# Patient Record
Sex: MALE | Race: ASIAN | NOT HISPANIC OR LATINO | URBAN - METROPOLITAN AREA
[De-identification: names, ages, dates, MRNs, and addresses within clinical notes are randomized per-mention and may not be internally consistent; named-entity substitution may affect disease eponyms.]

---

## 2021-11-15 ENCOUNTER — INPATIENT (INPATIENT)
Facility: HOSPITAL | Age: 86
LOS: 2 days | Discharge: HOME | DRG: 872 | End: 2021-11-18
Attending: INTERNAL MEDICINE | Admitting: INTERNAL MEDICINE
Payer: MEDICARE

## 2021-11-15 VITALS
OXYGEN SATURATION: 98 % | HEART RATE: 57 BPM | RESPIRATION RATE: 18 BRPM | TEMPERATURE: 98 F | WEIGHT: 134.92 LBS | SYSTOLIC BLOOD PRESSURE: 147 MMHG | DIASTOLIC BLOOD PRESSURE: 86 MMHG

## 2021-11-15 DIAGNOSIS — E11.9 TYPE 2 DIABETES MELLITUS WITHOUT COMPLICATIONS: ICD-10-CM

## 2021-11-15 DIAGNOSIS — A41.9 SEPSIS, UNSPECIFIED ORGANISM: ICD-10-CM

## 2021-11-15 DIAGNOSIS — N17.9 ACUTE KIDNEY FAILURE, UNSPECIFIED: ICD-10-CM

## 2021-11-15 DIAGNOSIS — D64.9 ANEMIA, UNSPECIFIED: ICD-10-CM

## 2021-11-15 DIAGNOSIS — R63.8 OTHER SYMPTOMS AND SIGNS CONCERNING FOOD AND FLUID INTAKE: ICD-10-CM
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
## 2021-11-15 DIAGNOSIS — I10 ESSENTIAL (PRIMARY) HYPERTENSION: ICD-10-CM

## 2021-11-15 LAB
ALBUMIN SERPL ELPH-MCNC: 3.3 G/DL — LOW (ref 3.4–5)
ALP SERPL-CCNC: 73 U/L — SIGNIFICANT CHANGE UP (ref 40–120)
ALT FLD-CCNC: 14 U/L — SIGNIFICANT CHANGE UP (ref 12–42)
ANION GAP SERPL CALC-SCNC: 9 MMOL/L — SIGNIFICANT CHANGE UP (ref 9–16)
APPEARANCE UR: CLEAR — SIGNIFICANT CHANGE UP
AST SERPL-CCNC: 30 U/L — SIGNIFICANT CHANGE UP (ref 15–37)
BACTERIA # UR AUTO: PRESENT /HPF
BASOPHILS # BLD AUTO: 0.09 K/UL — SIGNIFICANT CHANGE UP (ref 0–0.2)
BASOPHILS NFR BLD AUTO: 0.6 % — SIGNIFICANT CHANGE UP (ref 0–2)
BILIRUB SERPL-MCNC: 0.6 MG/DL — SIGNIFICANT CHANGE UP (ref 0.2–1.2)
BILIRUB UR-MCNC: NEGATIVE — SIGNIFICANT CHANGE UP
BUN SERPL-MCNC: 38 MG/DL — HIGH (ref 7–23)
CALCIUM SERPL-MCNC: 9.7 MG/DL — SIGNIFICANT CHANGE UP (ref 8.5–10.5)
CHLORIDE SERPL-SCNC: 99 MMOL/L — SIGNIFICANT CHANGE UP (ref 96–108)
CK SERPL-CCNC: 157 U/L — SIGNIFICANT CHANGE UP (ref 39–308)
CO2 SERPL-SCNC: 25 MMOL/L — SIGNIFICANT CHANGE UP (ref 22–31)
COLOR SPEC: YELLOW — SIGNIFICANT CHANGE UP
CREAT SERPL-MCNC: 1.75 MG/DL — HIGH (ref 0.5–1.3)
DIFF PNL FLD: ABNORMAL
EOSINOPHIL # BLD AUTO: 0.05 K/UL — SIGNIFICANT CHANGE UP (ref 0–0.5)
EOSINOPHIL NFR BLD AUTO: 0.3 % — SIGNIFICANT CHANGE UP (ref 0–6)
EPI CELLS # UR: SIGNIFICANT CHANGE UP /HPF (ref 0–5)
GLUCOSE SERPL-MCNC: 285 MG/DL — HIGH (ref 70–99)
GLUCOSE UR QL: >=1000
HCT VFR BLD CALC: 37.4 % — LOW (ref 39–50)
HGB BLD-MCNC: 11.9 G/DL — LOW (ref 13–17)
HYALINE CASTS # UR AUTO: SIGNIFICANT CHANGE UP /LPF (ref 0–2)
IMM GRANULOCYTES NFR BLD AUTO: 1.5 % — SIGNIFICANT CHANGE UP (ref 0–1.5)
KETONES UR-MCNC: 15 MG/DL
LEUKOCYTE ESTERASE UR-ACNC: NEGATIVE — SIGNIFICANT CHANGE UP
LG PLATELETS BLD QL AUTO: SLIGHT — SIGNIFICANT CHANGE UP
LYMPHOCYTES # BLD AUTO: 1.08 K/UL — SIGNIFICANT CHANGE UP (ref 1–3.3)
LYMPHOCYTES # BLD AUTO: 7.5 % — LOW (ref 13–44)
MAGNESIUM SERPL-MCNC: 2.2 MG/DL — SIGNIFICANT CHANGE UP (ref 1.6–2.6)
MANUAL SMEAR VERIFICATION: SIGNIFICANT CHANGE UP
MCHC RBC-ENTMCNC: 30.6 PG — SIGNIFICANT CHANGE UP (ref 27–34)
MCHC RBC-ENTMCNC: 31.8 GM/DL — LOW (ref 32–36)
MCV RBC AUTO: 96.1 FL — SIGNIFICANT CHANGE UP (ref 80–100)
MONOCYTES # BLD AUTO: 0.54 K/UL — SIGNIFICANT CHANGE UP (ref 0–0.9)
MONOCYTES NFR BLD AUTO: 3.8 % — SIGNIFICANT CHANGE UP (ref 2–14)
NEUTROPHILS # BLD AUTO: 12.4 K/UL — HIGH (ref 1.8–7.4)
NEUTROPHILS NFR BLD AUTO: 86.3 % — HIGH (ref 43–77)
NITRITE UR-MCNC: POSITIVE
NRBC # BLD: 0 /100 WBCS — SIGNIFICANT CHANGE UP (ref 0–0)
PH UR: 6 — SIGNIFICANT CHANGE UP (ref 5–8)
PLAT MORPH BLD: NORMAL — SIGNIFICANT CHANGE UP
PLATELET # BLD AUTO: 171 K/UL — SIGNIFICANT CHANGE UP (ref 150–400)
PLATELET COUNT - ESTIMATE: NORMAL — SIGNIFICANT CHANGE UP
POTASSIUM SERPL-MCNC: 5.1 MMOL/L — SIGNIFICANT CHANGE UP (ref 3.5–5.3)
POTASSIUM SERPL-SCNC: 5.1 MMOL/L — SIGNIFICANT CHANGE UP (ref 3.5–5.3)
PROT SERPL-MCNC: 8.6 G/DL — HIGH (ref 6.4–8.2)
PROT UR-MCNC: >=300 MG/DL
RBC # BLD: 3.89 M/UL — LOW (ref 4.2–5.8)
RBC # FLD: 13 % — SIGNIFICANT CHANGE UP (ref 10.3–14.5)
RBC BLD AUTO: NORMAL — SIGNIFICANT CHANGE UP
RBC CASTS # UR COMP ASSIST: > 10 /HPF
SARS-COV-2 RNA SPEC QL NAA+PROBE: SIGNIFICANT CHANGE UP
SODIUM SERPL-SCNC: 133 MMOL/L — SIGNIFICANT CHANGE UP (ref 132–145)
SP GR SPEC: >=1.03 — SIGNIFICANT CHANGE UP (ref 1–1.03)
TROPONIN I, HIGH SENSITIVITY RESULT: 38.6 NG/L — SIGNIFICANT CHANGE UP
UROBILINOGEN FLD QL: 0.2 E.U./DL — SIGNIFICANT CHANGE UP
WBC # BLD: 14.38 K/UL — HIGH (ref 3.8–10.5)
WBC # FLD AUTO: 14.38 K/UL — HIGH (ref 3.8–10.5)
WBC UR QL: ABNORMAL /HPF

## 2021-11-15 PROCEDURE — 99223 1ST HOSP IP/OBS HIGH 75: CPT | Mod: GC

## 2021-11-15 PROCEDURE — 99285 EMERGENCY DEPT VISIT HI MDM: CPT | Mod: 25

## 2021-11-15 PROCEDURE — 71045 X-RAY EXAM CHEST 1 VIEW: CPT | Mod: 26

## 2021-11-15 PROCEDURE — 70450 CT HEAD/BRAIN W/O DYE: CPT | Mod: 26,MH

## 2021-11-15 PROCEDURE — 93010 ELECTROCARDIOGRAM REPORT: CPT

## 2021-11-15 RX ORDER — ACETAMINOPHEN 500 MG
650 TABLET ORAL EVERY 6 HOURS
Refills: 0 | Status: DISCONTINUED | OUTPATIENT
Start: 2021-11-15 | End: 2021-11-18

## 2021-11-15 RX ORDER — POLYETHYLENE GLYCOL 3350 17 G/17G
17 POWDER, FOR SOLUTION ORAL DAILY
Refills: 0 | Status: DISCONTINUED | OUTPATIENT
Start: 2021-11-15 | End: 2021-11-18

## 2021-11-15 RX ORDER — DEXTROSE 50 % IN WATER 50 %
25 SYRINGE (ML) INTRAVENOUS ONCE
Refills: 0 | Status: DISCONTINUED | OUTPATIENT
Start: 2021-11-15 | End: 2021-11-18

## 2021-11-15 RX ORDER — SODIUM CHLORIDE 9 MG/ML
1000 INJECTION INTRAMUSCULAR; INTRAVENOUS; SUBCUTANEOUS ONCE
Refills: 0 | Status: DISCONTINUED | OUTPATIENT
Start: 2021-11-15 | End: 2021-11-16

## 2021-11-15 RX ORDER — CEFTRIAXONE 500 MG/1
1000 INJECTION, POWDER, FOR SOLUTION INTRAMUSCULAR; INTRAVENOUS ONCE
Refills: 0 | Status: COMPLETED | OUTPATIENT
Start: 2021-11-15 | End: 2021-11-15

## 2021-11-15 RX ORDER — SODIUM CHLORIDE 9 MG/ML
1000 INJECTION INTRAMUSCULAR; INTRAVENOUS; SUBCUTANEOUS ONCE
Refills: 0 | Status: COMPLETED | OUTPATIENT
Start: 2021-11-15 | End: 2021-11-15

## 2021-11-15 RX ORDER — SENNA PLUS 8.6 MG/1
2 TABLET ORAL AT BEDTIME
Refills: 0 | Status: DISCONTINUED | OUTPATIENT
Start: 2021-11-15 | End: 2021-11-18

## 2021-11-15 RX ORDER — GLUCAGON INJECTION, SOLUTION 0.5 MG/.1ML
1 INJECTION, SOLUTION SUBCUTANEOUS ONCE
Refills: 0 | Status: DISCONTINUED | OUTPATIENT
Start: 2021-11-15 | End: 2021-11-18

## 2021-11-15 RX ORDER — SODIUM CHLORIDE 9 MG/ML
1000 INJECTION, SOLUTION INTRAVENOUS
Refills: 0 | Status: DISCONTINUED | OUTPATIENT
Start: 2021-11-15 | End: 2021-11-18

## 2021-11-15 RX ORDER — DEXTROSE 50 % IN WATER 50 %
15 SYRINGE (ML) INTRAVENOUS ONCE
Refills: 0 | Status: DISCONTINUED | OUTPATIENT
Start: 2021-11-15 | End: 2021-11-18

## 2021-11-15 RX ORDER — INSULIN LISPRO 100/ML
VIAL (ML) SUBCUTANEOUS
Refills: 0 | Status: DISCONTINUED | OUTPATIENT
Start: 2021-11-15 | End: 2021-11-18

## 2021-11-15 RX ADMIN — CEFTRIAXONE 100 MILLIGRAM(S): 500 INJECTION, POWDER, FOR SOLUTION INTRAMUSCULAR; INTRAVENOUS at 18:20

## 2021-11-15 RX ADMIN — SODIUM CHLORIDE 2000 MILLILITER(S): 9 INJECTION INTRAMUSCULAR; INTRAVENOUS; SUBCUTANEOUS at 16:40

## 2021-11-15 NOTE — ED ADULT NURSE NOTE - CHPI ED NUR SYMPTOMS NEG
no blurred vision/no change in level of consciousness/no confusion/no nausea/no numbness/no vomiting

## 2021-11-15 NOTE — ED PROVIDER NOTE - ENMT, MLM
Airway patent, Nasal mucosa clear. Mouth with normal mucosa. Airway patent, Nasal mucosa clear. Mouth with normal mucosa.  Hard of hearing.

## 2021-11-15 NOTE — ED ADULT TRIAGE NOTE - CHIEF COMPLAINT QUOTE
elderly Mandarin speaking pt aisha from [facility] called 911 after found sitting on the ground. states he did not fall or pass out, but decided to sit down bc his legs felt "weak and heavy" pt has left arm drift, unable to lift and hold right leg and left leg hits the bed at 2 sec, pt endorses these symptoms are over 1 year old denies hx stroke; pmh HTN and DM states he is compliant with meds arrives aaox4 unable to ambulate elderly Mandarin speaking pt aisha from [facility] called 911 after found sitting on the ground. states he did not fall or pass out, but decided to sit down bc his legs felt "weak and heavy" pt has left arm drift, unable to lift and hold right leg and left leg hits the bed at 2 sec, pt endorses these symptoms are over 1 year old denies hx stroke; pmh HTN and DM states he is compliant with meds arrives aaox4 unable to ambulate [facility] SS 6

## 2021-11-15 NOTE — H&P ADULT - NSHPPHYSICALEXAM_GEN_ALL_CORE
VITAL SIGNS:  T(C): 36.9 (11-15-21 @ 20:53), Max: 36.9 (11-15-21 @ 20:53)  T(F): 98.5 (11-15-21 @ 20:53), Max: 98.5 (11-15-21 @ 20:53)  HR: 65 (11-15-21 @ 20:53) (57 - 65)  BP: 147/70 (11-15-21 @ 20:53) (147/70 - 148/70)  BP(mean): --  RR: 16 (11-15-21 @ 20:53) (16 - 18)  SpO2: 98% (11-15-21 @ 20:53) (98% - 98%)  Wt(kg): --    PHYSICAL EXAM:    Constitutional: WDWN resting comfortably in bed; NAD  Head: NC/AT  Eyes: PERRL, EOMI, anicteric sclera  ENT: no nasal discharge; uvula midline, no oropharyngeal erythema or exudates; MMM  Neck: supple; no JVD or thyromegaly  Respiratory: CTA B/L; no W/R/R, no retractions  Cardiac: +S1/S2; RRR; no M/R/G  Gastrointestinal: abdomen soft, NT/ND; no rebound or guarding; +BSx4  Back: spine midline, no bony tenderness or step-offs  Extremities: WWP, no clubbing or cyanosis; no peripheral edema  Musculoskeletal: NROM x4; no joint swelling, tenderness or erythema  Vascular: 2+ radial, DP/PT pulses B/L  Dermatologic: skin warm, dry and intact; no rashes, wounds, or scars  Lymphatic: no submandibular or cervical LAD  Neurologic: AAOx3; CNII-XII grossly intact; no focal deficits  Psychiatric: affect and characteristics of appearance, verbalizations, behaviors are appropriate VITAL SIGNS:  T(C): 36.9 (11-15-21 @ 20:53), Max: 36.9 (11-15-21 @ 20:53)  T(F): 98.5 (11-15-21 @ 20:53), Max: 98.5 (11-15-21 @ 20:53)  HR: 65 (11-15-21 @ 20:53) (57 - 65)  BP: 147/70 (11-15-21 @ 20:53) (147/70 - 148/70)  BP(mean): --  RR: 16 (11-15-21 @ 20:53) (16 - 18)  SpO2: 98% (11-15-21 @ 20:53) (98% - 98%)  Wt(kg): --    PHYSICAL EXAM:    Constitutional: thin, elderly male, resting comfortably in bed; NAD  Head: NC/AT  Eyes: PERRL, EOMI, anicteric sclera  ENT: no nasal discharge; uvula midline, no oropharyngeal erythema or exudates; MMM  Neck: supple  Respiratory: CTA B/L; no W/R/R, no retractions  Cardiac: +S1/S2; RRR; no M/R/G  Gastrointestinal: abdomen soft, NT/ND; no rebound or guarding; +BSx4  Back: spine midline, no bony tenderness or step-offs  Extremities: WWP, no clubbing or cyanosis; no peripheral edema  Musculoskeletal: NROM x4; no joint swelling, tenderness or erythema  Vascular: 2+ radial, DP/PT pulses B/L  Dermatologic: skin warm, dry and intact; no rashes, wounds, or scars  Lymphatic: no submandibular or cervical LAD  Neurologic: AAOx3; CNII-XII grossly intact; no focal deficits  Psychiatric: affect and characteristics of appearance, verbalizations, behaviors are appropriate

## 2021-11-15 NOTE — H&P ADULT - PROBLEM SELECTOR PLAN 5
Pt w/ hx of BPH - reports intermittent hematuria and he follows with a urologist  - reports issues with retention  - c/w finasteride 5 mg QD

## 2021-11-15 NOTE — ED PROVIDER NOTE - PROGRESS NOTE DETAILS
Labs and imaging reviewed.  Patient found to be ataxic during brief walking trial.  + leukocytosis with left shift, hyperglycemia, renal dysfunction, and UTI.  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 Given IVF for hyperglycemia and IV rocephin for UTI with urine culture sent.  ED  to perform as assessment prior to admission.  Accepted by Dr. Batista to [facility]. Granddaughter  Nemo - [phone]  Betsy - [phone]    Daughter  Elisa - [phone] Labs and imaging reviewed.  Patient found to be ataxic during brief walking trial.  + leukocytosis with left shift, hyperglycemia, renal dysfunction, and UTI.  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 Given IVF for hyperglycemia and IV Rocephin for UTI with urine culture sent.  ED  to perform as assessment prior to admission.  Accepted by Dr. Batista to [facility].

## 2021-11-15 NOTE — ED ADULT NURSE REASSESSMENT NOTE - NS ED NURSE REASSESS COMMENT FT1
Break coverage for maylin petty, maylin hood placed iv and started iv lfuids, pt awake, awaiting further review

## 2021-11-15 NOTE — H&P ADULT - PROBLEM SELECTOR PLAN 3
BUN/Cr 38/1.75, unknown baseline. Possibly pre-renal vs post-obstructive given evidence of dehydration on urinalysis and hx of BPH/retention.   - s/p 1 L NS, consider maintenance fluid  - bladder scan  - check urine lytes

## 2021-11-15 NOTE — ED ADULT NURSE NOTE - CHIEF COMPLAINT QUOTE
elderly Mandarin speaking pt aisha from [facility] called 911 after found sitting on the ground. states he did not fall or pass out, but decided to sit down bc his legs felt "weak and heavy" pt has left arm drift, unable to lift and hold right leg and left leg hits the bed at 2 sec, pt endorses these symptoms are over 1 year old denies hx stroke; pmh HTN and DM states he is compliant with meds arrives aaox4 unable to ambulate

## 2021-11-15 NOTE — ED PROVIDER NOTE - OBJECTIVE STATEMENT
89 y/o M with PMH of non-IDDM and hypertension [Hx obtained by triage nurse and Mandarin  Jakob [id number]] presents to the ED for chronic lower extremity pain and paresthesia that has been ongoing for many years which caused him to fall earlier today. Pt reports this has happened to him before in the past. He denies weakness, dizziness, lightheadedness prior to fall. He arrives to the ED with wife after bystander flagged down EMS. Pt is unable to articulate pharmacy or daily medication use. He has no previous visits to this ED per triage. He arrives without his walker and with some personal belongings. 89 y/o M with PMH of non-IDDM and hypertension [Hx obtained by triage nurse and Mandarin  Jakob [id number]] presents to the ED for chronic lower extremity pain and paresthesia that has been ongoing for many years which caused him to fall earlier today. Pt reports this has happened to him before in the past. He denies weakness, dizziness, lightheadedness prior to fall. He arrives to the ED with wife after bystander flagged down EMS. Pt is unable to articulate pharmacy or daily medication use. He has no previous visits to this ED per triage. He arrives without a walker and with some personal belongings.

## 2021-11-15 NOTE — H&P ADULT - PROBLEM SELECTOR PLAN 1
Pt presenting after fall today while out shopping, legs became weak and he fell on his buttocks. No prodromal syndromes. Denies any pain. CTH w/ chronic subdural collections/hygromas, no fx or bleed. Pt reports 2-3 years of leg pain and numbness likely 2/2 to possible peripheral neuropathy from DM.  - check a1c  - PT evaluation Pt presenting after fall today while out shopping, legs became weak and he fell on his buttocks. No prodromal syndromes. Denies any pain. CTH w/ chronic subdural collections/hygromas, no fx or bleed. Pt reports 2-3 years of leg pain and numbness likely 2/2 to possible peripheral neuropathy from DM, UTI  - check a1c, b12, folate,  - PT evaluation Pt presenting after fall today while out shopping, legs became weak and he fell on his buttocks. No prodromal syndromes. Denies any pain. CTH w/ chronic subdural collections/hygromas, no fx or bleed. Pt reports 2-3 years of leg pain and numbness likely 2/2 to possible peripheral neuropathy from DM. Fall likely in setting of UTI. Low suspicion for neurogenic or cardiac cause.  - check a1c, b12, folate,  - PT evaluation

## 2021-11-15 NOTE — H&P ADULT - PROBLEM SELECTOR PLAN 4
Hb 11.8, unknown baseline.  - pt reports 1-2 year hx of black stools  - continue to monitor Hb and stools Hb 11.8, unknown baseline.  - pt reports 1-2 year hx of black stools  - continue to monitor Hb and stools  - start ppi Hb 11.8, unknown baseline.  - pt reports 1-2 year hx of black stools  - continue to monitor Hb and stools  - start ppi  - fobt Hb 11.8, unknown baseline.  - pt reports 1-2 year hx of black stools, unclear as pt is poor historian   - continue to monitor Hb and stools  - start ppi  - check fobt

## 2021-11-15 NOTE — ED PROVIDER NOTE - CLINICAL SUMMARY MEDICAL DECISION MAKING FREE TEXT BOX
89 y/o M with PMH of hypertension and non-IDDM presents to the ED with fall vs syncope. History obtained via  although Pt is a poor historian. He endorses chronic presumed neuropathic pain, leg weakness, and frequent falls. Pt lives at home with his wife. Unable to obtain home medications list, PCP [Dr. Ami Car], or pharmacy information. Upon rapid assessment, Pt appears in his state of age. No acute distress, scalp hematoma, or trauma. Lungs are clear. No loud murmurs. No truncal ataxia. Pt placed on fall precautions. Plan for labs, urine testing, and imaging.

## 2021-11-15 NOTE — ED PROVIDER NOTE - CONSTITUTIONAL, MLM
normal... Well appearing, awake, alert, oriented to person, place, time/situation and in no apparent distress. Appears his stated age, awake, alert, oriented to person, place, time/situation and in no apparent distress.

## 2021-11-15 NOTE — ED ADULT NURSE REASSESSMENT NOTE - NS ED NURSE REASSESS COMMENT FT1
[facility] ED HADLEY Ruelas called to say this patient is a missing persons ([employer] present in [facility] ED looking for him). Was informed pt is admitted and will be going up to [address] [facility] but [facility] has asked that we hold patient until Officer Alexandra responds to gather information. Dr. Barajas made aware.

## 2021-11-15 NOTE — H&P ADULT - HISTORY OF PRESENT ILLNESS
Pt is a 89 y/o M w/ pmhx of HTN, DM presenting after fall today.    In ED, vitals: 96.2 rectal, /70, HR 59, /70, RR 18, 98% on RA  Labs: wbc 14.38, Hb 11.9, BUN/Cr 38/1.75 (unknown baseline), glu 285, U/A positive for protein, wbc 5-10  CXR: no acute infiltrates  EKG:   CTH: b/l chronic b/l subdural collections/hygromas  ED management: ceftriaxone x1 and 1 L NS   Pt is a 89 y/o M w/ pmhx of HTN, DM, BPH presenting after fall today. Pt states he was out shopping when his legs became weak and he fell on his bottom. He reports he has been having problems with numbness and weak for the past 2-3 years. He denies any prodromal symptoms of chest pain, SOB, palpitations, light headedness/dizziness prior to event. He did not hit his head or lose consciousness. He states he otherwise has been in his usual state of health. Denies any fever, chills, abd pain, n/v/d, or dysuria. Reports problems with urination/retention 2/2 to his enlarged prostate. Also reports constipation however states he has a bowel movement every day. Reports stools are hard.      In ED, vitals: 96.2 rectal, /70, HR 59, /70, RR 18, 98% on RA  Labs: wbc 14.38, Hb 11.9, BUN/Cr 38/1.75 (unknown baseline), glu 285, U/A positive for protein, wbc 5-10  CXR: no acute infiltrates  EKG:   CTH: b/l chronic b/l subdural collections/hygromas  ED management: ceftriaxone x1 and 1 L NS   Pt is a 89 y/o M w/ pmhx of HTN, DM, BPH presenting after fall today. Pt states he was out shopping when his legs became weak and he fell on his bottom. He reports he has been having problems with numbness and weak for the past 2-3 years. He denies any prodromal symptoms of chest pain, SOB, palpitations, light headedness/dizziness prior to event. He did not hit his head or lose consciousness. He states he otherwise has been in his usual state of health. Denies any fever, chills, abd pain, n/v/d, or dysuria. Reports problems with urination/retention 2/2 to his enlarged prostate. Also reports constipation however states he has a bowel movement every day. Reports stools are hard and black - states his stools have been black for 1-2 years. Had a colonoscopy in the past but no endoscopy.     In ED, vitals: 96.2 rectal, /70, HR 59, /70, RR 18, 98% on RA  Labs: wbc 14.38, Hb 11.9, BUN/Cr 38/1.75 (unknown baseline), glu 285, U/A positive for protein, wbc 5-10  CXR: no acute infiltrates  EKG:   CTH: b/l chronic b/l subdural collections/hygromas  ED management: ceftriaxone x1 and 1 L NS   Pt is a 89 y/o M w/ pmhx of HTN, DM, BPH presenting after fall today. Pt states he was out shopping when his legs became weak and he fell on his bottom. He reports he has been having problems with numbness and weak for the past 2-3 years. Denies any back pain, b/b incontinence. He denies any prodromal symptoms of chest pain, SOB, palpitations, light headedness/dizziness prior to event. He did not hit his head or lose consciousness. He states he otherwise has been in his usual state of health. Denies any fever, chills, abd pain, n/v/d, or dysuria. Reports problems with urination/retention 2/2 to his enlarged prostate. Also reports constipation however states he has a bowel movement every day. Reports stools are hard and black - states his stools have been black for 1-2 years. Had a colonoscopy in the past but no endoscopy.     In ED, vitals: 96.2 rectal, /70, HR 59, /70, RR 18, 98% on RA  Labs: wbc 14.38, Hb 11.9, BUN/Cr 38/1.75 (unknown baseline), glu 285, U/A positive for protein, wbc 5-10  CXR: no acute infiltrates  EKG: sinus bradycardia, no acute ischemic changes  CTH: b/l chronic b/l subdural collections/hygromas  ED management: ceftriaxone x1 and 1 L NS

## 2021-11-15 NOTE — ED PROVIDER NOTE - NEUROLOGICAL, MLM
Alert and oriented, no focal deficits, no motor or sensory deficits. Alert and oriented.  Slow but clear speech. Follows basic commands.  No limb rigidity.  No truncal ataxia but when standing is unable to take more than 1-2 steps with assistance.

## 2021-11-15 NOTE — ED BEHAVIORAL HEALTH NOTE - BEHAVIORAL HEALTH NOTE
SW was consulted to the ED by Provider to complete psychosocial assessment with PT.  A  was used to complete assessment #[id number] Daniel.   Team made aware and SW made available for further assistance.

## 2021-11-15 NOTE — H&P ADULT - NSHPLABSRESULTS_GEN_ALL_CORE
.  LABS:                         11.9   14.38 )-----------( 171      ( 15 Nov 2021 16:01 )             37.4     11-15    133  |  99  |  38<H>  ----------------------------<  285<H>  5.1   |  25  |  1.75<H>    Ca    9.7      15 Nov 2021 16:01  Mg     2.2     11-15    TPro  8.6<H>  /  Alb  3.3<L>  /  TBili  0.6  /  DBili  x   /  AST  30  /  ALT  14  /  AlkPhos  73  11-15      Urinalysis Basic - ( 15 Nov 2021 17:11 )    Color: Yellow / Appearance: Clear / SG: >=1.030 / pH: x  Gluc: x / Ketone: 15 mg/dL  / Bili: NEGATIVE / Urobili: 0.2 E.U./dL   Blood: x / Protein: >=300 mg/dL / Nitrite: POSITIVE   Leuk Esterase: NEGATIVE / RBC: > 10 /HPF / WBC 5-10 /HPF   Sq Epi: x / Non Sq Epi: 0-5 /HPF / Bacteria: Present /HPF      CARDIAC MARKERS ( 15 Nov 2021 17:35 )  x     / x     / 157 U/L / x     / x                RADIOLOGY, EKG & ADDITIONAL TESTS: Reviewed.

## 2021-11-15 NOTE — H&P ADULT - PROBLEM SELECTOR PLAN 8
F: s/p 1 L NS  E: replete prn  N: dash/tlc, consistent carbs  DVT: SCDs, possible melena?  Dispo: RMF F: s/p 1 L NS  E: replete prn  N: dash/tlc, consistent carbs  DVT: SCDs  Dispo: RMF

## 2021-11-15 NOTE — ED ADULT NURSE REASSESSMENT NOTE - NS ED NURSE REASSESS COMMENT FT1
used  Jakob [id number] to ask pt to pass urine otherwise we can do straight cath, pt wants his mandarin speaking wife to be informed pt is here as she will be worried her name is Terri Ron  , details given to dr hawk, also informed pt we will require rectal temp as per dr hawk

## 2021-11-15 NOTE — H&P ADULT - ATTENDING COMMENTS
#Fall: Presents after fall today while out shopping, legs became weak and he fell on his buttocks. Denies head trauma, Syncope, LOC, prodromal symptoms. Found to be septic likely 2/2 UTI. Denies bladder/bowel incontinence or back pain, no sciatica. No FND; CTH w/ chronic hygromas. EKG nsr, non ischmic; trops negative. Fall/weakness likely 2/2 sepsis and UTI; unlikely associated w/ chonic hygromas, reasses gait in AM. Consider NSG; f/up b12, folate, tsh, rpr, PT/OT consult    #Sepsis: Likely 2/2 UTI; UA contaminated however +symptoms; c/w ceft , f/up urine and blood cx. Bladder scan #Fall: Presents after fall today while out shopping, legs became weak and he fell on his buttocks. Denies head trauma, Syncope, LOC, prodromal symptoms. Found to be septic likely 2/2 UTI. Denies bladder/bowel incontinence or back pain, no sciatica. No FND; CTH w/ chronic hygromas. EKG sinus inez, non ischmic; trops negative. Fall/weakness likely 2/2 sepsis and UTI; unlikely associated w/ chonic hygromas, reasses gait in AM. Consider NSG; f/up b12, folate, tsh, rpr, PT/OT consult    #Sepsis: Likely 2/2 UTI; UA contaminated however +symptoms; c/w ceft , f/up urine and blood cx. Bladder scan

## 2021-11-15 NOTE — H&P ADULT - PROBLEM SELECTOR PLAN 2
Pt met 2/4 SIRS criteria with T 96.2 rectal, wbc 14.38 with possible UTI - U/A w/ wbc 5-10 wbc.  - pt s/p 1 L NS in ED and ceftriaxone 1 g  - Pt denies any dysuria however given hx of BPH/urinary retention and pt meeting SIRS criteria, will continue with ceftriaxone.  - f/u urine cx  - obtain bcx

## 2021-11-16 ENCOUNTER — TRANSCRIPTION ENCOUNTER (OUTPATIENT)
Age: 86
End: 2021-11-16

## 2021-11-16 DIAGNOSIS — N40.0 BENIGN PROSTATIC HYPERPLASIA WITHOUT LOWER URINARY TRACT SYMPTOMS: ICD-10-CM

## 2021-11-16 DIAGNOSIS — N39.0 URINARY TRACT INFECTION, SITE NOT SPECIFIED: ICD-10-CM

## 2021-11-16 LAB
A1C WITH ESTIMATED AVERAGE GLUCOSE RESULT: >20.1 % — HIGH (ref 4–5.6)
ANION GAP SERPL CALC-SCNC: 14 MMOL/L — SIGNIFICANT CHANGE UP (ref 5–17)
BASOPHILS # BLD AUTO: 0.05 K/UL — SIGNIFICANT CHANGE UP (ref 0–0.2)
BASOPHILS NFR BLD AUTO: 0.4 % — SIGNIFICANT CHANGE UP (ref 0–2)
BLD GP AB SCN SERPL QL: NEGATIVE — SIGNIFICANT CHANGE UP
BUN SERPL-MCNC: 32 MG/DL — HIGH (ref 7–23)
CALCIUM SERPL-MCNC: 9.6 MG/DL — SIGNIFICANT CHANGE UP (ref 8.4–10.5)
CHLORIDE SERPL-SCNC: 102 MMOL/L — SIGNIFICANT CHANGE UP (ref 96–108)
CO2 SERPL-SCNC: 20 MMOL/L — LOW (ref 22–31)
CREAT ?TM UR-MCNC: 73 MG/DL — SIGNIFICANT CHANGE UP
CREAT ?TM UR-MCNC: 75 MG/DL — SIGNIFICANT CHANGE UP
CREAT SERPL-MCNC: 1.27 MG/DL — SIGNIFICANT CHANGE UP (ref 0.5–1.3)
EOSINOPHIL # BLD AUTO: 0.02 K/UL — SIGNIFICANT CHANGE UP (ref 0–0.5)
EOSINOPHIL NFR BLD AUTO: 0.2 % — SIGNIFICANT CHANGE UP (ref 0–6)
ESTIMATED AVERAGE GLUCOSE: >441 MG/DL — HIGH (ref 68–114)
FERRITIN SERPL-MCNC: 150 NG/ML — SIGNIFICANT CHANGE UP (ref 30–400)
GLUCOSE BLDC GLUCOMTR-MCNC: 150 MG/DL — HIGH (ref 70–99)
GLUCOSE BLDC GLUCOMTR-MCNC: 219 MG/DL — HIGH (ref 70–99)
GLUCOSE BLDC GLUCOMTR-MCNC: 281 MG/DL — HIGH (ref 70–99)
GLUCOSE BLDC GLUCOMTR-MCNC: 340 MG/DL — HIGH (ref 70–99)
GLUCOSE SERPL-MCNC: 324 MG/DL — HIGH (ref 70–99)
HCT VFR BLD CALC: 34.5 % — LOW (ref 39–50)
HGB BLD-MCNC: 11.1 G/DL — LOW (ref 13–17)
IMM GRANULOCYTES NFR BLD AUTO: 0.6 % — SIGNIFICANT CHANGE UP (ref 0–1.5)
IRON SATN MFR SERPL: 24 % — SIGNIFICANT CHANGE UP (ref 16–55)
IRON SATN MFR SERPL: 45 UG/DL — SIGNIFICANT CHANGE UP (ref 45–165)
LYMPHOCYTES # BLD AUTO: 17.4 % — SIGNIFICANT CHANGE UP (ref 13–44)
LYMPHOCYTES # BLD AUTO: 2.01 K/UL — SIGNIFICANT CHANGE UP (ref 1–3.3)
MAGNESIUM SERPL-MCNC: 2.3 MG/DL — SIGNIFICANT CHANGE UP (ref 1.6–2.6)
MCHC RBC-ENTMCNC: 30.2 PG — SIGNIFICANT CHANGE UP (ref 27–34)
MCHC RBC-ENTMCNC: 32.2 GM/DL — SIGNIFICANT CHANGE UP (ref 32–36)
MCV RBC AUTO: 94 FL — SIGNIFICANT CHANGE UP (ref 80–100)
MONOCYTES # BLD AUTO: 0.99 K/UL — HIGH (ref 0–0.9)
MONOCYTES NFR BLD AUTO: 8.6 % — SIGNIFICANT CHANGE UP (ref 2–14)
NEUTROPHILS # BLD AUTO: 8.41 K/UL — HIGH (ref 1.8–7.4)
NEUTROPHILS NFR BLD AUTO: 72.8 % — SIGNIFICANT CHANGE UP (ref 43–77)
NRBC # BLD: 0 /100 WBCS — SIGNIFICANT CHANGE UP (ref 0–0)
PHOSPHATE SERPL-MCNC: 4.4 MG/DL — SIGNIFICANT CHANGE UP (ref 2.5–4.5)
PLATELET # BLD AUTO: 178 K/UL — SIGNIFICANT CHANGE UP (ref 150–400)
POTASSIUM SERPL-MCNC: 4.4 MMOL/L — SIGNIFICANT CHANGE UP (ref 3.5–5.3)
POTASSIUM SERPL-SCNC: 4.4 MMOL/L — SIGNIFICANT CHANGE UP (ref 3.5–5.3)
PROT ?TM UR-MCNC: 161 MG/DL — HIGH (ref 0–12)
PROT/CREAT UR-RTO: 2.2 RATIO — HIGH (ref 0–0.2)
RBC # BLD: 3.67 M/UL — LOW (ref 4.2–5.8)
RBC # FLD: 13.2 % — SIGNIFICANT CHANGE UP (ref 10.3–14.5)
RH IG SCN BLD-IMP: POSITIVE — SIGNIFICANT CHANGE UP
SODIUM SERPL-SCNC: 136 MMOL/L — SIGNIFICANT CHANGE UP (ref 135–145)
SODIUM UR-SCNC: 97 MMOL/L — SIGNIFICANT CHANGE UP
TIBC SERPL-MCNC: 187 UG/DL — LOW (ref 220–430)
TSH SERPL-MCNC: 0.84 UIU/ML — SIGNIFICANT CHANGE UP (ref 0.27–4.2)
UIBC SERPL-MCNC: 142 UG/DL — SIGNIFICANT CHANGE UP (ref 110–370)
WBC # BLD: 11.55 K/UL — HIGH (ref 3.8–10.5)
WBC # FLD AUTO: 11.55 K/UL — HIGH (ref 3.8–10.5)

## 2021-11-16 PROCEDURE — 99222 1ST HOSP IP/OBS MODERATE 55: CPT | Mod: GC

## 2021-11-16 PROCEDURE — 99232 SBSQ HOSP IP/OBS MODERATE 35: CPT | Mod: GC

## 2021-11-16 RX ORDER — PANTOPRAZOLE SODIUM 20 MG/1
40 TABLET, DELAYED RELEASE ORAL EVERY 12 HOURS
Refills: 0 | Status: DISCONTINUED | OUTPATIENT
Start: 2021-11-16 | End: 2021-11-16

## 2021-11-16 RX ORDER — CEFTRIAXONE 500 MG/1
1000 INJECTION, POWDER, FOR SOLUTION INTRAMUSCULAR; INTRAVENOUS EVERY 24 HOURS
Refills: 0 | Status: COMPLETED | OUTPATIENT
Start: 2021-11-16 | End: 2021-11-17

## 2021-11-16 RX ORDER — INFLUENZA VIRUS VACCINE 15; 15; 15; 15 UG/.5ML; UG/.5ML; UG/.5ML; UG/.5ML
0.7 SUSPENSION INTRAMUSCULAR ONCE
Refills: 0 | Status: DISCONTINUED | OUTPATIENT
Start: 2021-11-16 | End: 2021-11-18

## 2021-11-16 RX ORDER — INSULIN LISPRO 100/ML
3 VIAL (ML) SUBCUTANEOUS
Refills: 0 | Status: DISCONTINUED | OUTPATIENT
Start: 2021-11-16 | End: 2021-11-16

## 2021-11-16 RX ORDER — ATORVASTATIN CALCIUM 80 MG/1
40 TABLET, FILM COATED ORAL AT BEDTIME
Refills: 0 | Status: DISCONTINUED | OUTPATIENT
Start: 2021-11-16 | End: 2021-11-18

## 2021-11-16 RX ORDER — INSULIN GLARGINE 100 [IU]/ML
10 INJECTION, SOLUTION SUBCUTANEOUS AT BEDTIME
Refills: 0 | Status: DISCONTINUED | OUTPATIENT
Start: 2021-11-16 | End: 2021-11-16

## 2021-11-16 RX ORDER — FINASTERIDE 5 MG/1
5 TABLET, FILM COATED ORAL DAILY
Refills: 0 | Status: DISCONTINUED | OUTPATIENT
Start: 2021-11-16 | End: 2021-11-18

## 2021-11-16 RX ORDER — INSULIN GLARGINE 100 [IU]/ML
12 INJECTION, SOLUTION SUBCUTANEOUS AT BEDTIME
Refills: 0 | Status: DISCONTINUED | OUTPATIENT
Start: 2021-11-16 | End: 2021-11-17

## 2021-11-16 RX ORDER — SODIUM CHLORIDE 9 MG/ML
1000 INJECTION, SOLUTION INTRAVENOUS ONCE
Refills: 0 | Status: COMPLETED | OUTPATIENT
Start: 2021-11-16 | End: 2021-11-16

## 2021-11-16 RX ORDER — ENOXAPARIN SODIUM 100 MG/ML
40 INJECTION SUBCUTANEOUS EVERY 24 HOURS
Refills: 0 | Status: DISCONTINUED | OUTPATIENT
Start: 2021-11-16 | End: 2021-11-18

## 2021-11-16 RX ORDER — INSULIN LISPRO 100/ML
8 VIAL (ML) SUBCUTANEOUS
Refills: 0 | Status: DISCONTINUED | OUTPATIENT
Start: 2021-11-16 | End: 2021-11-17

## 2021-11-16 RX ADMIN — Medication 4: at 17:46

## 2021-11-16 RX ADMIN — PANTOPRAZOLE SODIUM 40 MILLIGRAM(S): 20 TABLET, DELAYED RELEASE ORAL at 06:50

## 2021-11-16 RX ADMIN — ATORVASTATIN CALCIUM 40 MILLIGRAM(S): 80 TABLET, FILM COATED ORAL at 21:48

## 2021-11-16 RX ADMIN — INSULIN GLARGINE 12 UNIT(S): 100 INJECTION, SOLUTION SUBCUTANEOUS at 22:21

## 2021-11-16 RX ADMIN — POLYETHYLENE GLYCOL 3350 17 GRAM(S): 17 POWDER, FOR SOLUTION ORAL at 12:56

## 2021-11-16 RX ADMIN — Medication 3 UNIT(S): at 12:56

## 2021-11-16 RX ADMIN — Medication 6: at 09:53

## 2021-11-16 RX ADMIN — SENNA PLUS 2 TABLET(S): 8.6 TABLET ORAL at 21:48

## 2021-11-16 RX ADMIN — FINASTERIDE 5 MILLIGRAM(S): 5 TABLET, FILM COATED ORAL at 12:57

## 2021-11-16 RX ADMIN — Medication 8 UNIT(S): at 17:46

## 2021-11-16 RX ADMIN — Medication 8: at 12:57

## 2021-11-16 RX ADMIN — ENOXAPARIN SODIUM 40 MILLIGRAM(S): 100 INJECTION SUBCUTANEOUS at 22:17

## 2021-11-16 RX ADMIN — CEFTRIAXONE 100 MILLIGRAM(S): 500 INJECTION, POWDER, FOR SOLUTION INTRAMUSCULAR; INTRAVENOUS at 17:46

## 2021-11-16 RX ADMIN — SODIUM CHLORIDE 1000 MILLILITER(S): 9 INJECTION, SOLUTION INTRAVENOUS at 12:57

## 2021-11-16 NOTE — DISCHARGE NOTE PROVIDER - NSDCMRMEDTOKEN_GEN_ALL_CORE_FT
atorvastatin 40 mg oral tablet: 1 tab(s) orally once a day (at bedtime)  cefpodoxime 100 mg oral tablet: 1 tab(s) orally every 12 hours  finasteride 5 mg oral tablet: 1 tab(s) orally once a day  insulin glargine: 12 unit(s) subcutaneous once a day (at bedtime)  insulin lispro 100 units/mL injectable solution: 12 unit(s) subcutaneous 3 times a day before meals  tamsulosin 0.4 mg oral capsule: 1 cap(s) orally once a day (at bedtime)   atorvastatin 40 mg oral tablet: 1 tab(s) orally once a day (at bedtime)  Basaglar KwikPen 100 units/mL subcutaneous solution: 12 unit(s) subcutaneous once a day (at bedtime)   cefpodoxime 100 mg oral tablet: 1 tab(s) orally every 12 hours  finasteride 5 mg oral tablet: 1 tab(s) orally once a day  Insulin Lispro KwikPen 100 units/mL injectable solution: 12 unit(s) injectable 3 times a day before meals   tamsulosin 0.4 mg oral capsule: 1 cap(s) orally once a day (at bedtime)   atorvastatin 40 mg oral tablet: 1 tab(s) orally once a day (at bedtime)  Basaglar KwikPen 100 units/mL subcutaneous solution: 12 unit(s) subcutaneous once a day (at bedtime)   cefpodoxime 100 mg oral tablet: 1 tab(s) orally every 12 hours  finasteride 5 mg oral tablet: 1 tab(s) orally once a day  Insulin Lispro KwikPen 100 units/mL injectable solution: 12 unit(s) injectable 3 times a day before meals   lancets: 1 application subcutaneously 4 times a day   tamsulosin 0.4 mg oral capsule: 1 cap(s) orally once a day (at bedtime)

## 2021-11-16 NOTE — PROGRESS NOTE ADULT - PROBLEM SELECTOR PLAN 2
SIRS 2/4 hypothermic, leukocytosis w source UTI. No pulmonary complaints or other source of infxn. Pt fluid resuscitated and started on CTX. WBC downtrending, temp normal. Weakness resolved.  - c/w CTX 1g daily  - f/u blood cx  - f/u urine cx

## 2021-11-16 NOTE — DISCHARGE NOTE PROVIDER - CARE PROVIDERS DIRECT ADDRESSES
[email or web address]_Unknown ,DirectAddress_[email or web address]

## 2021-11-16 NOTE — PROGRESS NOTE ADULT - PROBLEM SELECTOR PLAN 5
Pt has complaints of dysuria w hx of BPH and uses jardiance. UA positive for WBC and presented w sepsis.  - c/w CTX 1 g daily

## 2021-11-16 NOTE — PHYSICAL THERAPY INITIAL EVALUATION ADULT - IMPAIRMENTS FOUND, PT EVAL
aerobic capacity/endurance/gait, locomotion, and balance/muscle strength/neuromotor development and sensory integration/posture

## 2021-11-16 NOTE — CONSULT NOTE ADULT - ASSESSMENT
per Internal Medicine    89 y/o M w/ pmhx of HTN, DM, BPH presenting after fall today. Pt admitted for sepsis 2/2 to possible UTI and PT eval.    Problem/Plan - 1:  ·  Problem: Fall.   ·  Plan: Pt presenting after fall today while out shopping, legs became weak and he fell on his buttocks. No prodromal syndromes. Denies any pain. CTH w/ chronic subdural collections/hygromas, no fx or bleed. Pt reports 2-3 years of leg pain and numbness likely 2/2 to possible peripheral neuropathy from DM. Fall likely in setting of UTI. Low suspicion for neurogenic or cardiac cause.  - check a1c, b12, folate,  - PT evaluation.    Problem/Plan - 2:  ·  Problem: Sepsis.   ·  Plan: Pt met 2/4 SIRS criteria with T 96.2 rectal, wbc 14.38 with possible UTI - U/A w/ wbc 5-10 wbc.  - pt s/p 1 L NS in ED and ceftriaxone 1 g  - Pt denies any dysuria however given hx of BPH/urinary retention and pt meeting SIRS criteria, will continue with ceftriaxone.  - f/u urine cx  - obtain bcx.    Problem/Plan - 3:  ·  Problem: YOVANI (acute kidney injury).   ·  Plan: BUN/Cr 38/1.75, unknown baseline. Possibly pre-renal vs post-obstructive given evidence of dehydration on urinalysis and hx of BPH/retention.   - s/p 1 L NS, consider maintenance fluid  - bladder scan  - check urine lytes.    Problem/Plan - 4:  ·  Problem: Anemia.   ·  Plan: Hb 11.8, unknown baseline.  - pt reports 1-2 year hx of black stools, unclear as pt is poor historian   - continue to monitor Hb and stools  - start ppi  - check fobt.    Problem/Plan - 5:  ·  Problem: BPH (benign prostatic hyperplasia).   ·  Plan: Pt w/ hx of BPH - reports intermittent hematuria and he follows with a urologist  - reports issues with retention  - c/w finasteride 5 mg QD.    Problem/Plan - 6:  ·  Problem: HTN (hypertension).   ·  Plan: - BP well controlled, pt unsure of medications  - med rec.    Problem/Plan - 7:  ·  Problem: DM (diabetes mellitus).   ·  Plan: - glucose elevated  - f/u A1C  - mISS  - not on insulin, formal med rec in AM.    Problem/Plan - 8:  ·  Problem: Nutrition, metabolism, and development symptoms.   ·  Plan: F: s/p 1 L NS  E: replete prn  N: dash/tlc, consistent carbs  DVT: SCDs  Dispo: RMF.

## 2021-11-16 NOTE — PROGRESS NOTE ADULT - PROBLEM SELECTOR PLAN 1
Patient felt weak and steadied himself to the ground. Likely weak in setting of sepsis and dehydration (see below). SIRS+ w urinary complaints and positive UA, high urine spec grav and dry on exam. Patient did not hit head, CT showed no bleed or acute infarction, no focal deficit. No metabolic derangement to explain weakness or fall. No medication to explain weakness. Improvement in weakness after fluid resuscitation and starting abx. Orthostats negative.   - continue to treat UTI per plan below  - PT recs home with home PT

## 2021-11-16 NOTE — DISCHARGE NOTE PROVIDER - CARE PROVIDER_API CALL
Josemanuel Earl)  EndocrinologyMetabDiabetes; Internal Medicine  [address] [address]  Phone: [phone]  Fax: [phone]  Follow Up Time:     NICOLE CORONA  Urology  [address] [facility] [address]  [place] [address]  Phone: ()-  Fax: ()-  Follow Up Time:    Josemanuel Earl)  EndocrinologyMetabDiabetes; Internal Medicine  [address] [address]  Phone: [phone]  Fax: [phone]  Follow Up Time:     David Guillermo  UROLOGY  [address], [address]  [place] [address]  Phone: [phone]  Fax: (   )    -  Scheduled Appointment: 12/02/2021 01:00 PM   David Guillermo  UROLOGY  [address], [address]  [place] [address]  Phone: [phone]  Fax: (   )    -  Scheduled Appointment: 12/02/2021 01:00 PM    Gallito Keenan)  [address]  [address]  [place] [address]  Phone: [phone]  Fax: [phone]  Scheduled Appointment: 12/15/2021 02:20 PM

## 2021-11-16 NOTE — PHYSICAL THERAPY INITIAL EVALUATION ADULT - GENERAL OBSERVATIONS, REHAB EVAL
PT IE Completed. Pt recieved semi-supine in bed, Cantonese speaking, A&Ox3, +RA, in NAD and agreeable to work with PT, MARI Couch notified. Pt presents to [facility] s/p unwitnessed fall.Pt reports he sat down and bystanders believed he fell and called 911.Pt left as found, +bed alarm, +call harrington within reach, MARI Couch notified. Neg orthostatics, vitals given to MARI Couch. Pt can benefit from PT in order to improve quality of life by increasing strength/endurance/balance with functional mobility and ADLS.

## 2021-11-16 NOTE — CONSULT NOTE ADULT - ATTENDING COMMENTS
Pt seen on rounds this afternoon.  Daughter and grand-daughter were at the bedside and contributed to the visit  89-yo Cantonese-speaking man admitted after a fall, noted to have markedly uncontrolled DM--was only moderately hyperglycemic (294 mg%) on presentation but A1c level was 20%  Has only a 2-yr history of DM, but has noted fingersticks of 300-500 at home despite combination therapy with metformin, glipizide and Jardiance.  Has likely lost weight but unsure how much.  He appears to be reasonably compliant with his diet--"allowed" carbs are likely mildly excessive at time, but there are no concentrated sweets, etc  He almost certainly needs insulin at this point, and despite his advanced age, could well even be an evolving type I diabetic.  Will check antibodies and C-peptide  To start on standing insulin with 12 units Lantus, 8 units pre-meal

## 2021-11-16 NOTE — DISCHARGE NOTE PROVIDER - NSDCCPCAREPLAN_GEN_ALL_CORE_FT
PRINCIPAL DISCHARGE DIAGNOSIS  Diagnosis: Acute UTI  Assessment and Plan of Treatment: Urinary tract infections, also called "UTIs," are infections that affect either the bladder or the kidneys. Bladder infections are more common than kidney infections. Bladder infections happen when bacteria get into the urethra and travel up into the bladder. Kidney infections happen when the bacteria travel even higher, up into the kidneys. The symptoms of a bladder infection include pain or a burning feeling when you urinate, the need to urinate often, the need to urinate suddenly or in a hurry, blood in the urine. Signs that an infection has spread to the kidneys include fever, back pain, or nausea/vomiting. It is important that you take your antibiotics as prescribed and to completion to properly treat your urinary tract infection and prevent antibiotic resistance.        SECONDARY DISCHARGE DIAGNOSES  Diagnosis: YOVANI (acute kidney injury)  Assessment and Plan of Treatment: During this hospital admission you were found to have acute kidney injury. Acute kidney injury (YOVANI) is a sudden episode of kidney failure or kidney damage that happens within a few hours or a few days. YOVANI causes a build-up of waste products in your blood and makes it hard for your kidneys to keep the right balance of fluid in your body. The cause in your case was likely dehydration. Your kidney numbers have been improving with fluids. Please follow up with your primary care doctor for further evaluation and testing.      Diagnosis: Ataxia  Assessment and Plan of Treatment:      PRINCIPAL DISCHARGE DIAGNOSIS  Diagnosis: Acute UTI  Assessment and Plan of Treatment: Urinary tract infections, also called "UTIs," are infections that affect either the bladder or the kidneys. Bladder infections are more common than kidney infections. Bladder infections happen when bacteria get into the urethra and travel up into the bladder. Kidney infections happen when the bacteria travel even higher, up into the kidneys. The symptoms of a bladder infection include pain or a burning feeling when you urinate, the need to urinate often, the need to urinate suddenly or in a hurry, blood in the urine. Signs that an infection has spread to the kidneys include fever, back pain, or nausea/vomiting. It is important that you take your antibiotics as prescribed and to completion to properly treat your urinary tract infection and prevent antibiotic resistance.        SECONDARY DISCHARGE DIAGNOSES  Diagnosis: YOVANI (acute kidney injury)  Assessment and Plan of Treatment: During this hospital admission you were found to have acute kidney injury. Acute kidney injury (YOVANI) is a sudden episode of kidney failure or kidney damage that happens within a few hours or a few days. YOVANI causes a build-up of waste products in your blood and makes it hard for your kidneys to keep the right balance of fluid in your body. The cause in your case was likely dehydration. Your kidney numbers have been improving with fluids. Please follow up with your primary care doctor for further evaluation and testing.       PRINCIPAL DISCHARGE DIAGNOSIS  Diagnosis: Acute UTI  Assessment and Plan of Treatment: Urinary tract infections, also called "UTIs," are infections that affect either the bladder or the kidneys. Bladder infections are more common than kidney infections. Bladder infections happen when bacteria get into the urethra and travel up into the bladder. Kidney infections happen when the bacteria travel even higher, up into the kidneys. The symptoms of a bladder infection include pain or a burning feeling when you urinate, the need to urinate often, the need to urinate suddenly or in a hurry, blood in the urine. Signs that an infection has spread to the kidneys include fever, back pain, or nausea/vomiting. It is important that you take your antibiotics as prescribed and to completion to properly treat your urinary tract infection and prevent antibiotic resistance. Please take cefpodoxime 100 mg every 12 hours (for 6 doses)        SECONDARY DISCHARGE DIAGNOSES  Diagnosis: YOVANI (acute kidney injury)  Assessment and Plan of Treatment: During this hospital admission you were found to have acute kidney injury. Acute kidney injury (YOVANI) is a sudden episode of kidney failure or kidney damage that happens within a few hours or a few days. YOVANI causes a build-up of waste products in your blood and makes it hard for your kidneys to keep the right balance of fluid in your body. The cause in your case was likely dehydration. Your kidney numbers have been improving with fluids. Please follow up with your primary care doctor for further evaluation and testing.

## 2021-11-16 NOTE — PROGRESS NOTE ADULT - PROBLEM SELECTOR PLAN 9
Fluids: s/p 2L  Electrolytes: Mg>2, K>4  Nutrition:  consistent carb  Prophylaxis: lovenox 40 sq daily  Activity: AAT, OOBTC  GI: none  C: FC  Dispo: Admit to F, home w home pt

## 2021-11-16 NOTE — DISCHARGE NOTE PROVIDER - PROVIDER TOKENS
PROVIDER:[TOKEN:[[id number]]],PROVIDER:[TOKEN:[[id number]]] PROVIDER:[TOKEN:[[id number]]],FREE:[LAST:[Mima],FIRST:[David],PHONE:[phone]],FAX:[(   )    -],ADDRESS:[UROLOGY  [address] [place] [address]],SCHEDULEDAPPT:[12/02/2021],SCHEDULEDAPPTTIME:[01:00 PM]] FREE:[LAST:[Mima],FIRST:[David],PHONE:[phone]],FAX:[(   )    -],ADDRESS:[UROLOGY  [address] [place] [address], [place] [address]],SCHEDULEDAPPT:[12/02/2021],SCHEDULEDAPPTTIME:[01:00 PM]],PROVIDER:[TOKEN:[[id number]],SCHEDULEDAPPT:[12/15/2021],SCHEDULEDAPPTTIME:[02:20 PM]]

## 2021-11-16 NOTE — PHYSICAL THERAPY INITIAL EVALUATION ADULT - ADDITIONAL COMMENTS
Pt reports he lives with his wife in an apartment building with no CRISTINA and has elevator access. Pt has [facility] services 4days/5hrs. He has a walk in shower and regular toilet. He uses a shower chair to bathe independently and has rails in the shower. Pt is able to do his own grocery shopping and uses public buses for transportation.

## 2021-11-16 NOTE — CONSULT NOTE ADULT - SUBJECTIVE AND OBJECTIVE BOX
Patient is a 89y old  Male who presents with a chief complaint of fall (16 Nov 2021 13:30)       HPI:  Pt is a 89 y/o M w/ pmhx of HTN, DM, BPH presenting after fall today. Pt states he was out shopping when his legs became weak and he fell on his bottom. He reports he has been having problems with numbness and weak for the past 2-3 years. Denies any back pain, b/b incontinence. He denies any prodromal symptoms of chest pain, SOB, palpitations, light headedness/dizziness prior to event. He did not hit his head or lose consciousness. He states he otherwise has been in his usual state of health. Denies any fever, chills, abd pain, n/v/d, or dysuria. Reports problems with urination/retention 2/2 to his enlarged prostate. Also reports constipation however states he has a bowel movement every day. Reports stools are hard and black - states his stools have been black for 1-2 years. Had a colonoscopy in the past but no endoscopy.     In ED, vitals: 96.2 rectal, /70, HR 59, /70, RR 18, 98% on RA  Labs: wbc 14.38, Hb 11.9, BUN/Cr 38/1.75 (unknown baseline), glu 285, U/A positive for protein, wbc 5-10  CXR: no acute infiltrates  EKG: sinus bradycardia, no acute ischemic changes  CTH: b/l chronic b/l subdural collections/hygromas  ED management: ceftriaxone x1 and 1 L NS   (15 Nov 2021 22:35)      PAST MEDICAL & SURGICAL HISTORY:  Diabetes mellitus    HTN (hypertension)        MEDICATIONS  (STANDING):  atorvastatin 40 milliGRAM(s) Oral at bedtime  cefTRIAXone   IVPB 1000 milliGRAM(s) IV Intermittent every 24 hours  dextrose 40% Gel 15 Gram(s) Oral once  dextrose 5%. 1000 milliLiter(s) (50 mL/Hr) IV Continuous <Continuous>  dextrose 50% Injectable 25 Gram(s) IV Push once  enoxaparin Injectable 40 milliGRAM(s) SubCutaneous every 24 hours  finasteride 5 milliGRAM(s) Oral daily  glucagon  Injectable 1 milliGRAM(s) IntraMuscular once  influenza  Vaccine (HIGH DOSE) 0.7 milliLiter(s) IntraMuscular once  insulin glargine Injectable (LANTUS) 10 Unit(s) SubCutaneous at bedtime  insulin lispro (ADMELOG) corrective regimen sliding scale   SubCutaneous Before meals and at bedtime  insulin lispro Injectable (ADMELOG) 3 Unit(s) SubCutaneous three times a day before meals  polyethylene glycol 3350 17 Gram(s) Oral daily  senna 2 Tablet(s) Oral at bedtime    MEDICATIONS  (PRN):  acetaminophen     Tablet .. 650 milliGRAM(s) Oral every 6 hours PRN Temp greater or equal to 38C (100.4F), Mild Pain (1 - 3)    FAMILY HISTORY:      CBC Full  -  ( 16 Nov 2021 06:14 )  WBC Count : 11.55 K/uL  RBC Count : 3.67 M/uL  Hemoglobin : 11.1 g/dL  Hematocrit : 34.5 %  Platelet Count - Automated : 178 K/uL  Mean Cell Volume : 94.0 fl  Mean Cell Hemoglobin : 30.2 pg  Mean Cell Hemoglobin Concentration : 32.2 gm/dL  Auto Neutrophil # : 8.41 K/uL  Auto Lymphocyte # : 2.01 K/uL  Auto Monocyte # : 0.99 K/uL  Auto Eosinophil # : 0.02 K/uL  Auto Basophil # : 0.05 K/uL  Auto Neutrophil % : 72.8 %  Auto Lymphocyte % : 17.4 %  Auto Monocyte % : 8.6 %  Auto Eosinophil % : 0.2 %  Auto Basophil % : 0.4 %      11-16    136  |  102  |  32<H>  ----------------------------<  324<H>  4.4   |  20<L>  |  1.27    Ca    9.6      16 Nov 2021 06:14  Phos  4.4     11-16  Mg     2.3     11-16    TPro  8.6<H>  /  Alb  3.3<L>  /  TBili  0.6  /  DBili  x   /  AST  30  /  ALT  14  /  AlkPhos  73  11-15      Urinalysis Basic - ( 15 Nov 2021 17:11 )    Color: Yellow / Appearance: Clear / SG: >=1.030 / pH: x  Gluc: x / Ketone: 15 mg/dL  / Bili: NEGATIVE / Urobili: 0.2 E.U./dL   Blood: x / Protein: >=300 mg/dL / Nitrite: POSITIVE   Leuk Esterase: NEGATIVE / RBC: > 10 /HPF / WBC 5-10 /HPF   Sq Epi: x / Non Sq Epi: 0-5 /HPF / Bacteria: Present /HPF          Radiology:    < from: Xray Chest 1 View AP/PA (11.15.21 @ 16:15) >  EXAM:  XR CHEST AP OR PA 1V                           PROCEDURE DATE:  11/15/2021          INTERPRETATION:  TECHNIQUE: Single portable view of the chest.    COMPARISON:  None    CLINICAL HISTORY: weakness    FINDINGS:    Single frontal view of the chest demonstrates the lungs to be clear. The cardiomediastinal silhouette is enlarged. No acute osseous abnormalities.    IMPRESSION: No acute cardiopulmonary disease process.      < from: CT Head No Cont (11.15.21 @ 15:53) >  EXAM:  CT BRAIN                           PROCEDURE DATE:  11/15/2021          INTERPRETATION:  PROCEDURE: CT head without intravenous contrast    INDICATION: Generalized weakness; history of CVA    TECHNIQUE: Multiple axial images were obtained at5 mm intervals from the skull base to the vertex. Sagittal and coronal reformatted images were obtained from the axial data set. The images were reviewed in brain and bone windows.    COMPARISON: None    FINDINGS: The CT examination demonstrates age appropriate volume loss. In addition, there are bilateral hypodense subdural collection/hygromas. The right-sided collection measures approximately 1.7 cm in greatest width whereas the left sided collection measures approximately 1.4 cm (series 2 image 19). This There is no midline shift. The gray white differentiation appears within normal limits. There is no intracranial hemorrhage or acute transcortical infarct. There is mild patchy hypodensity within the periventricular white matter which is nonspecific and may represent the sequela of small vessel ischemic disease in this patient.    The bony windows demonstrates no fractures. The left lens is absent which may be secondary to prior cataract surgery. The visualized paranasal sinuses are within normal limits. The mastoid air cells are well aerated.    Limited evaluation of the TMJs demonstrates degenerative changes bilaterally.    IMPRESSION: Bilateral chronic subdural collection/hygromas. No intracranial hemorrhage or acute transcortical infarct.                Vital Signs Last 24 Hrs  T(C): 36.8 (16 Nov 2021 10:01), Max: 36.9 (15 Nov 2021 20:53)  T(F): 98.3 (16 Nov 2021 10:01), Max: 98.5 (15 Nov 2021 20:53)  HR: 64 (16 Nov 2021 05:44) (57 - 74)  BP: 120/72 (16 Nov 2021 05:44) (120/72 - 148/70)  BP(mean): --  RR: 18 (16 Nov 2021 10:01) (16 - 18)  SpO2: 96% (16 Nov 2021 10:01) (96% - 98%)        REVIEW OF SYSTEMS: as per HPI, s/p fall      Physical Exam:  thin, frail 89 yo Chinese gentleman lying in semi Llanes's position, awake, alert, no acute complaints    Head: normocephalic, atraumatic    Eyes: PERRLA, EOMI, no nystagmus, sclera anicteric    ENT: nasal discharge, uvula midline, no oropharyngeal erythema/exudate    Neck: supple, negative JVD, negative carotid bruits, no thyromegaly    Chest: CTA bilaterally, neg wheeze/ rhonchi/ rales/ crackles/ egophany    Cardiovascular: regular rate and rhythm, neg murmurs/rubs/gallops    Abdomen: soft, non distended, non tender to palpation in all 4 quadrants, negative rebound/guarding, normal bowel sounds    Extremities: WWP, neg cyanosis/clubbing/edema, negative calf tenderness to palpation, negative Andres's sign    Neurologic Exam:    Alert and oriented x 2 to person, place,  speech fluent w/o dysarthria, follows commands    Cranial Nerves:     II:                         pupils equal, round and reactive to light, visual fields intact   III/ IV/VI:              extraocular movements intact, neg nystagmus, neg ptosis  V:                        facial sensation intact, V1-3 normal  VII:                      face symmetric, no droop, normal eye closure and smile  VIII:                     hearing intact to finger rub bilaterally  IX and X:             no hoarseness, gag intact, palate/ uvula rise symmetrically  XI:                       SCM/ trapezius strength intact bilateral  XII:                      no tongue deviation    Motor Exam:    Right UE:             no focal weakness > 3+/5                             pronator drift: neg    Left UE:               no focal weakness > 3+/5                             pronator drift: neg      Right LE:              no focal weakness > 3+/5    Left LE:                no focal weakness > 3+/5               Sensation:           intact to light touch x 4 extremities                                                 DTR:                  biceps/brachioradialis: equal                                                    patella/ankle: equal                                                      neg clonus                           neg Babinski                      Gait:  not tested        PM&R Impression:    1) s/p fall  2) deconditioned  3) no focal weakness    Recommendations/ Plan :    1) Physical therapy focusing on therapeutic exercises, bed mobility/transfer out of bed evaluation, progressive ambulation with assistive devices prn.    2) Anticipated Disposition Plan/Recs:    d/c home with home physical therapy

## 2021-11-16 NOTE — PROGRESS NOTE ADULT - PROBLEM SELECTOR PLAN 4
Likely pre renal as patient rapidly improved w fluid resuscitation and pt dry on exam w high spec grav. Pt did have proteinuria, could be diabetic nephropathy, possibility for a nephrotic syndrome though pt not edematous w albuin in normal range.   - f/u Pr/Cr ratio  - treat sepsis as per plan above

## 2021-11-16 NOTE — DISCHARGE NOTE PROVIDER - NSDCFUADDAPPT_GEN_ALL_CORE_FT
Please bring your Insurance card, Photo ID, and Discharge paperwork to the following appointment:    (1) Please follow up with your Urology Provider, Dr. David Guillermo at [address] [place] [address], [place] [address] on 12/02/2021 at 1:00pm.    Appointment was scheduled by Ms. CELINA Franco, Referral Coordinator.   Please bring your Insurance card, Photo ID, and Discharge paperwork to the following appointments:    (1) Please follow up with your Urology Provider, Dr. David Guillermo at [address] [place], [facility] [address], [place] [address] on 12/02/2021 at 1:00pm.    Appointment was scheduled by Ms. CELINA Franco, Referral Coordinator.    (2) Please follow up with your Endocrinology Provider, Dr. Gallito Keenan at [address] [place], [facility] [place], [place] [address] on 12/15/2021 at 1:00pm.    Appointment was scheduled by Ms. CELINA Franco, Referral Coordinator.

## 2021-11-16 NOTE — PHYSICAL THERAPY INITIAL EVALUATION ADULT - ORIENTATION, REHAB EVAL
Unable to name [facility] but reports hospital on [address]; Unable to state correct month, but states correct year. Pt reoriented to time and place./person/place/situation

## 2021-11-16 NOTE — PROGRESS NOTE ADULT - SUBJECTIVE AND OBJECTIVE BOX
INTERVAL HPI/OVERNIGHT EVENTS:  Patient was seen and examined at bedside. Patient had no complaints this morning. Patient does not feel lightheaded and denies feeling lightheaded upon standing. No dysuria, CP or SOB. He does have dark stools of several months, no blood.    VITAL SIGNS:  T(F): 98.3 (11-16-21 @ 10:01)  HR: 64 (11-16-21 @ 05:44)  BP: 120/72 (11-16-21 @ 05:44)  RR: 18 (11-16-21 @ 10:01)  SpO2: 96% (11-16-21 @ 10:01)  Wt(kg): --    PHYSICAL EXAM:      MEDICATIONS  (STANDING):  atorvastatin 40 milliGRAM(s) Oral at bedtime  cefTRIAXone   IVPB 1000 milliGRAM(s) IV Intermittent every 24 hours  dextrose 40% Gel 15 Gram(s) Oral once  dextrose 5%. 1000 milliLiter(s) (50 mL/Hr) IV Continuous <Continuous>  dextrose 50% Injectable 25 Gram(s) IV Push once  enoxaparin Injectable 40 milliGRAM(s) SubCutaneous every 24 hours  finasteride 5 milliGRAM(s) Oral daily  glucagon  Injectable 1 milliGRAM(s) IntraMuscular once  influenza  Vaccine (HIGH DOSE) 0.7 milliLiter(s) IntraMuscular once  insulin glargine Injectable (LANTUS) 10 Unit(s) SubCutaneous at bedtime  insulin lispro (ADMELOG) corrective regimen sliding scale   SubCutaneous Before meals and at bedtime  insulin lispro Injectable (ADMELOG) 3 Unit(s) SubCutaneous three times a day before meals  polyethylene glycol 3350 17 Gram(s) Oral daily  senna 2 Tablet(s) Oral at bedtime    MEDICATIONS  (PRN):  acetaminophen     Tablet .. 650 milliGRAM(s) Oral every 6 hours PRN Temp greater or equal to 38C (100.4F), Mild Pain (1 - 3)      Allergies    No Known Allergies    Intolerances        LABS:                        11.1   11.55 )-----------( 178      ( 16 Nov 2021 06:14 )             34.5     11-16    136  |  102  |  32<H>  ----------------------------<  324<H>  4.4   |  20<L>  |  1.27    Ca    9.6      16 Nov 2021 06:14  Phos  4.4     11-16  Mg     2.3     11-16    TPro  8.6<H>  /  Alb  3.3<L>  /  TBili  0.6  /  DBili  x   /  AST  30  /  ALT  14  /  AlkPhos  73  11-15      Urinalysis Basic - ( 15 Nov 2021 17:11 )    Color: Yellow / Appearance: Clear / SG: >=1.030 / pH: x  Gluc: x / Ketone: 15 mg/dL  / Bili: NEGATIVE / Urobili: 0.2 E.U./dL   Blood: x / Protein: >=300 mg/dL / Nitrite: POSITIVE   Leuk Esterase: NEGATIVE / RBC: > 10 /HPF / WBC 5-10 /HPF   Sq Epi: x / Non Sq Epi: 0-5 /HPF / Bacteria: Present /HPF          RADIOLOGY & ADDITIONAL TESTS:  Reviewed INTERVAL HPI/OVERNIGHT EVENTS:  Patient was seen and examined at bedside. Patient had no complaints this morning. Patient does not feel lightheaded and denies feeling lightheaded upon standing. No dysuria, CP or SOB. He does have dark stools of several months, no blood.    VITAL SIGNS:  T(F): 98.3 (11-16-21 @ 10:01)  HR: 64 (11-16-21 @ 05:44)  BP: 120/72 (11-16-21 @ 05:44)  RR: 18 (11-16-21 @ 10:01)  SpO2: 96% (11-16-21 @ 10:01)  Wt(kg): --    PHYSICAL EXAM:    GEN - thin elderly m lying flat in bed appears comfortable  HEENT - NCAT, no scleral icterus, dry mucous membranes  RESP - CTA BL, no increased work of breathing  CARDIO - NS1S2, RRR. No murmurs/rubs/gallops.  ABD - Soft/Non tender/Non distended  Ext - No ELIEL. no signs of venous/arterial stasis ulcers, warm well perfused  Neuro - AOx3cn 2-12 grossly intact. strength 5/5 throughout,   Skin - clean, dry, intact. no rashes or lesions.        MEDICATIONS  (STANDING):  atorvastatin 40 milliGRAM(s) Oral at bedtime  cefTRIAXone   IVPB 1000 milliGRAM(s) IV Intermittent every 24 hours  dextrose 40% Gel 15 Gram(s) Oral once  dextrose 5%. 1000 milliLiter(s) (50 mL/Hr) IV Continuous <Continuous>  dextrose 50% Injectable 25 Gram(s) IV Push once  enoxaparin Injectable 40 milliGRAM(s) SubCutaneous every 24 hours  finasteride 5 milliGRAM(s) Oral daily  glucagon  Injectable 1 milliGRAM(s) IntraMuscular once  influenza  Vaccine (HIGH DOSE) 0.7 milliLiter(s) IntraMuscular once  insulin glargine Injectable (LANTUS) 10 Unit(s) SubCutaneous at bedtime  insulin lispro (ADMELOG) corrective regimen sliding scale   SubCutaneous Before meals and at bedtime  insulin lispro Injectable (ADMELOG) 3 Unit(s) SubCutaneous three times a day before meals  polyethylene glycol 3350 17 Gram(s) Oral daily  senna 2 Tablet(s) Oral at bedtime    MEDICATIONS  (PRN):  acetaminophen     Tablet .. 650 milliGRAM(s) Oral every 6 hours PRN Temp greater or equal to 38C (100.4F), Mild Pain (1 - 3)      Allergies    No Known Allergies    Intolerances        LABS:                        11.1   11.55 )-----------( 178      ( 16 Nov 2021 06:14 )             34.5     11-16    136  |  102  |  32<H>  ----------------------------<  324<H>  4.4   |  20<L>  |  1.27    Ca    9.6      16 Nov 2021 06:14  Phos  4.4     11-16  Mg     2.3     11-16    TPro  8.6<H>  /  Alb  3.3<L>  /  TBili  0.6  /  DBili  x   /  AST  30  /  ALT  14  /  AlkPhos  73  11-15      Urinalysis Basic - ( 15 Nov 2021 17:11 )    Color: Yellow / Appearance: Clear / SG: >=1.030 / pH: x  Gluc: x / Ketone: 15 mg/dL  / Bili: NEGATIVE / Urobili: 0.2 E.U./dL   Blood: x / Protein: >=300 mg/dL / Nitrite: POSITIVE   Leuk Esterase: NEGATIVE / RBC: > 10 /HPF / WBC 5-10 /HPF   Sq Epi: x / Non Sq Epi: 0-5 /HPF / Bacteria: Present /HPF          RADIOLOGY & ADDITIONAL TESTS:  Reviewed

## 2021-11-16 NOTE — CONSULT NOTE ADULT - SUBJECTIVE AND OBJECTIVE BOX
HPI: 89 y/o M w/ pmhx of HTN, DM, BPH presenting after fall today. Pt states he was out shopping when his legs became weak and he fell on his bottom. He reports he has been having problems with numbness and weak for the past 2-3 years. Denies any back pain, b/b incontinence. He denies any prodromal symptoms of chest pain, SOB, palpitations, light headedness/dizziness prior to event. He did not hit his head or lose consciousness. He states he otherwise has been in his usual state of health. Denies any fever, chills, abd pain, n/v/d, or dysuria. Reports problems with urination/retention 2/2 to his enlarged prostate. Also reports constipation however states he has a bowel movement every day. Reports stools are hard and black - states his stools have been black for 1-2 years. Had a colonoscopy in the past but no endoscopy.     In ED, vitals: 96.2 rectal, /70, HR 59, /70, RR 18, 98% on RA  Labs: wbc 14.38, Hb 11.9, BUN/Cr 38/1.75 (unknown baseline), glu 285, U/A positive for protein, wbc 5-10  CXR: no acute infiltrates  EKG: sinus bradycardia, no acute ischemic changes  CTH: b/l chronic b/l subdural collections/hygromas  ED management: ceftriaxone x1 and 1 L NS      Dx:  Hx and duration of insulin:  Current Therapy:  Hx of hypoglycemia  Hx of DKA/HHS?    Home FSG:  Fasting  Lunch  Dinner  Bed    Diet:    Hx of other regimens  Complications:  Outpatient Endo:    PMH & Surgical Hx:FALL    Handoff    MEWS Score    Diabetes mellitus    HTN (hypertension)    Diabetes mellitus    HTN (hypertension)    Acute UTI    Fall    Sepsis    HTN (hypertension)    DM (diabetes mellitus)    YOVANI (acute kidney injury)    Anemia    Nutrition, metabolism, and development symptoms    BPH (benign prostatic hyperplasia)    FALL    Ataxia    [email or web address]        FH:  DM:  Thyroid:  Autoimmune:  Other:    SH:  Smoking  Etoh:  Recreational Drugs:  Social Life:    Current Meds:  acetaminophen     Tablet .. 650 milliGRAM(s) Oral every 6 hours PRN  atorvastatin 40 milliGRAM(s) Oral at bedtime  cefTRIAXone   IVPB 1000 milliGRAM(s) IV Intermittent every 24 hours  dextrose 40% Gel 15 Gram(s) Oral once  dextrose 5%. 1000 milliLiter(s) IV Continuous <Continuous>  dextrose 50% Injectable 25 Gram(s) IV Push once  enoxaparin Injectable 40 milliGRAM(s) SubCutaneous every 24 hours  finasteride 5 milliGRAM(s) Oral daily  glucagon  Injectable 1 milliGRAM(s) IntraMuscular once  influenza  Vaccine (HIGH DOSE) 0.7 milliLiter(s) IntraMuscular once  insulin glargine Injectable (LANTUS) 10 Unit(s) SubCutaneous at bedtime  insulin lispro (ADMELOG) corrective regimen sliding scale   SubCutaneous Before meals and at bedtime  insulin lispro Injectable (ADMELOG) 3 Unit(s) SubCutaneous three times a day before meals  lactated ringers Bolus 1000 milliLiter(s) IV Bolus once  polyethylene glycol 3350 17 Gram(s) Oral daily  senna 2 Tablet(s) Oral at bedtime      Allergies:  No Known Allergies      ROS:  Denies the following except as indicated.    General: weight loss/weight gain, decreased appetite, fatigue  Eyes: Blurry vision, double vision, visual changes  ENT: Throat pain, changes in voice,   CV: palpitations, SOB, CP, cough  GI: NVD, difficulty swallowing, abdominal pain  : polyuria, dysuria  Endo: abnormal menses, temperature intolerance, decreased libido  MSK: weakness, joint pain  Skin: rash, dryness, diaphoresis  Heme: Easy bruising,bleeding  Neuro: HA, dizziness, lightheadedness, numbness tingling  Psych: Anxiety, Depression    Vital Signs Last 24 Hrs  T(C): 36.8 (16 Nov 2021 10:01), Max: 36.9 (15 Nov 2021 20:53)  T(F): 98.3 (16 Nov 2021 10:01), Max: 98.5 (15 Nov 2021 20:53)  HR: 64 (16 Nov 2021 05:44) (57 - 74)  BP: 120/72 (16 Nov 2021 05:44) (120/72 - 148/70)  BP(mean): --  RR: 18 (16 Nov 2021 10:01) (16 - 18)  SpO2: 96% (16 Nov 2021 10:01) (96% - 98%)    Weight (kg): 61.2 (11-15 @ 15:17)      Constitutional: wn/wd in NAD.   HEENT: NCAT, MMM, OP clear, EOMI, , no proptosis or lid retraction  Neck: no thyromegaly or palpable thyroid nodules   Respiratory: lungs CTAB.  Cardiovascular: regular rhythm, normal S1 and S2, no audible murmurs, no peripheral edema  GI: soft, NT/ND, no masses/HSM appreciated.  Neurology: no tremors, DTR 2+  Skin: no visible rashes/lesions  Psychiatric: AAO x 3, normal affect/mood.  Ext: radial pulses intact, DP pulses intact, extremities warm, no cyanosis, clubbing or edema.       LABS:                        11.1   11.55 )-----------( 178      ( 16 Nov 2021 06:14 )             34.5     11-16    136  |  102  |  32<H>  ----------------------------<  324<H>  4.4   |  20<L>  |  1.27    Ca    9.6      16 Nov 2021 06:14  Phos  4.4     11-16  Mg     2.3     11-16    TPro  8.6<H>  /  Alb  3.3<L>  /  TBili  0.6  /  DBili  x   /  AST  30  /  ALT  14  /  AlkPhos  73  11-15      Urinalysis Basic - ( 15 Nov 2021 17:11 )    Color: Yellow / Appearance: Clear / SG: >=1.030 / pH: x  Gluc: x / Ketone: 15 mg/dL  / Bili: NEGATIVE / Urobili: 0.2 E.U./dL   Blood: x / Protein: >=300 mg/dL / Nitrite: POSITIVE   Leuk Esterase: NEGATIVE / RBC: > 10 /HPF / WBC 5-10 /HPF   Sq Epi: x / Non Sq Epi: 0-5 /HPF / Bacteria: Present /HPF        Thyroid Stimulating Hormone, Serum: 0.842 (11-16 @ 06:14)      RADIOLOGY & ADDITIONAL STUDIES:  CAPILLARY BLOOD GLUCOSE      POCT Blood Glucose.: 281 mg/dL (16 Nov 2021 09:05)  POCT Blood Glucose.: 294 mg/dL (15 Nov 2021 16:14)        A/P:89 y/o M w/ pmhx of HTN, DM, BPH presenting after fall today. Pt admitted for sepsis 2/2 to possible UTI and PT eval.  A1C: >20%  Wt: 6.2kg  Cr: 1.27  GFR: 49  Home regimen: Per surescripts : Jardiance 10mg, Glipizide 5mg ER, Metformin 500mg   1.  DM type 2  Please continue lantus       units at night.    Please continue lispro      units before each meal.    Please continue lispro moderate dose sliding scale four times daily with meals and at bedtime    Pt's fingerstick glucose goal is 100-180    Will continue to monitor     For discharge, pt can continue    Pt can follow up at discharge with [facility] by calling  to make an appointment.   Will discuss case with     and update primary team HPI: 89 y/o M w/ pmhx of HTN, DM, BPH presenting after fall today. Pt states he was out shopping when his legs became weak and he fell on his bottom. He reports he has been having problems with numbness and weak for the past 2-3 years. Denies any back pain, b/b incontinence. He denies any prodromal symptoms of chest pain, SOB, palpitations, light headedness/dizziness prior to event. He did not hit his head or lose consciousness. He states he otherwise has been in his usual state of health. Denies any fever, chills, abd pain, n/v/d, or dysuria. Reports problems with urination/retention 2/2 to his enlarged prostate. Also reports constipation however states he has a bowel movement every day. Reports stools are hard and black - states his stools have been black for 1-2 years. Had a colonoscopy in the past but no endoscopy.     In ED, vitals: 96.2 rectal, /70, HR 59, /70, RR 18, 98% on RA  Labs: wbc 14.38, Hb 11.9, BUN/Cr 38/1.75 (unknown baseline), glu 285, U/A positive for protein, wbc 5-10  CXR: no acute infiltrates  EKG: sinus bradycardia, no acute ischemic changes  CTH: b/l chronic b/l subdural collections/hygromas  ED management: ceftriaxone x1 and 1 L NS     used   Dx: Was diagnosed with diabetes 1-2 years ago by PCP.  Hx and duration of insulin: never used insulin or told that he needed it  Current Therapy: Per surescripts- Glipizide 5mg ER, Jardiance 10mg, Metformin 500mg BID  Hx of hypoglycemia- never hypoglycemic  Hx of DKA/HHS? none    Home FSG:  Fasting 300-500      Diet: mainly eats rice, noodles and bread    Hx of other regimens  Complications: +neuropathy, goes to eye doctor yearly unknown retinopathy   Outpatient Endo: Does not have one only has family doctor    PMH   Diabetes mellitus  HTN (hypertension)  Anemia  Nutrition, metabolism, and development symptoms  BPH (benign prostatic hyperplasia)      FH: no known family history     SH: non smoker, no alcohol use, no illicit drug use      Current Meds:  acetaminophen     Tablet .. 650 milliGRAM(s) Oral every 6 hours PRN  atorvastatin 40 milliGRAM(s) Oral at bedtime  cefTRIAXone   IVPB 1000 milliGRAM(s) IV Intermittent every 24 hours  dextrose 40% Gel 15 Gram(s) Oral once  dextrose 5%. 1000 milliLiter(s) IV Continuous <Continuous>  dextrose 50% Injectable 25 Gram(s) IV Push once  enoxaparin Injectable 40 milliGRAM(s) SubCutaneous every 24 hours  finasteride 5 milliGRAM(s) Oral daily  glucagon  Injectable 1 milliGRAM(s) IntraMuscular once  influenza  Vaccine (HIGH DOSE) 0.7 milliLiter(s) IntraMuscular once  insulin glargine Injectable (LANTUS) 10 Unit(s) SubCutaneous at bedtime  insulin lispro (ADMELOG) corrective regimen sliding scale   SubCutaneous Before meals and at bedtime  insulin lispro Injectable (ADMELOG) 3 Unit(s) SubCutaneous three times a day before meals  lactated ringers Bolus 1000 milliLiter(s) IV Bolus once  polyethylene glycol 3350 17 Gram(s) Oral daily  senna 2 Tablet(s) Oral at bedtime      Allergies:  No Known Allergies      ROS:  Denies the following except as indicated.    General: +weight loss, decreased appetite, +fatigue  Eyes: Blurry vision, double vision, visual changes  ENT: Throat pain, changes in voice,   CV: palpitations, SOB, CP, cough  GI: NVD, difficulty swallowing, abdominal pain  : +polyuria, dysuria  Endo: abnormal menses, temperature intolerance, decreased libido  MSK: +weakness, joint pain  Skin: rash, dryness, diaphoresis  Heme: Easy bruising,bleeding  Neuro: HA, dizziness, lightheadedness, +numbness tingling  Psych: Anxiety, Depression    Vital Signs Last 24 Hrs  T(C): 36.8 (16 Nov 2021 10:01), Max: 36.9 (15 Nov 2021 20:53)  T(F): 98.3 (16 Nov 2021 10:01), Max: 98.5 (15 Nov 2021 20:53)  HR: 64 (16 Nov 2021 05:44) (57 - 74)  BP: 120/72 (16 Nov 2021 05:44) (120/72 - 148/70)  BP(mean): --  RR: 18 (16 Nov 2021 10:01) (16 - 18)  SpO2: 96% (16 Nov 2021 10:01) (96% - 98%)    Weight (kg): 61.2 (11-15 @ 15:17)      Constitutional: thin elderly male, NAD.   HEENT: NCAT, MMM, OP clear, EOMI, , no proptosis or lid retraction  Neck: no thyromegaly or palpable thyroid nodules   Respiratory: lungs CTAB.  Cardiovascular: regular rhythm, normal S1 and S2, no audible murmurs, no peripheral edema  GI: soft, NT/ND, no masses/HSM appreciated.  Neurology: no tremors, DTR 2+  Skin: no visible rashes/lesions  Psychiatric: AAO x 3, normal affect/mood.  Ext: radial pulses intact, DP pulses intact, extremities warm, no cyanosis, clubbing or edema.       LABS:                        11.1   11.55 )-----------( 178      ( 16 Nov 2021 06:14 )             34.5     11-16    136  |  102  |  32<H>  ----------------------------<  324<H>  4.4   |  20<L>  |  1.27    Ca    9.6      16 Nov 2021 06:14  Phos  4.4     11-16  Mg     2.3     11-16    TPro  8.6<H>  /  Alb  3.3<L>  /  TBili  0.6  /  DBili  x   /  AST  30  /  ALT  14  /  AlkPhos  73  11-15      Urinalysis Basic - ( 15 Nov 2021 17:11 )    Color: Yellow / Appearance: Clear / SG: >=1.030 / pH: x  Gluc: x / Ketone: 15 mg/dL  / Bili: NEGATIVE / Urobili: 0.2 E.U./dL   Blood: x / Protein: >=300 mg/dL / Nitrite: POSITIVE   Leuk Esterase: NEGATIVE / RBC: > 10 /HPF / WBC 5-10 /HPF   Sq Epi: x / Non Sq Epi: 0-5 /HPF / Bacteria: Present /HPF        Thyroid Stimulating Hormone, Serum: 0.842 (11-16 @ 06:14)      RADIOLOGY & ADDITIONAL STUDIES:  CAPILLARY BLOOD GLUCOSE      POCT Blood Glucose.: 281 mg/dL (16 Nov 2021 09:05)  POCT Blood Glucose.: 294 mg/dL (15 Nov 2021 16:14)        A/P:89 y/o M w/ pmhx of HTN, DM, BPH presenting after fall today. Pt admitted for sepsis 2/2 to possible UTI and PT eval.  A1C: >20%  Wt: 61.2kg  Cr: 1.27  GFR: 49  Home regimen: Per surescripts : Jardiance 10mg, Glipizide 5mg ER, Metformin 500mg BID  1.  DM type 2- uncontrolled   Please continue lantus       units at night.    Please continue lispro      units before each meal.    Please continue lispro moderate dose sliding scale four times daily with meals and at bedtime    Pt's fingerstick glucose goal is 100-180    Will continue to monitor     For discharge, pt can continue    Pt can follow up at discharge with [facility] by calling  to make an appointment.   Will discuss case with     and update primary team HPI: 89 y/o M w/ pmhx of HTN, DM, BPH presenting after fall today. Pt states he was out shopping when his legs became weak and he fell on his bottom. He reports he has been having problems with numbness and weak for the past 2-3 years. Denies any back pain, b/b incontinence. He denies any prodromal symptoms of chest pain, SOB, palpitations, light headedness/dizziness prior to event. He did not hit his head or lose consciousness. He states he otherwise has been in his usual state of health. Denies any fever, chills, abd pain, n/v/d, or dysuria. Reports problems with urination/retention 2/2 to his enlarged prostate. Also reports constipation however states he has a bowel movement every day. Reports stools are hard and black - states his stools have been black for 1-2 years. Had a colonoscopy in the past but no endoscopy.     In ED, vitals: 96.2 rectal, /70, HR 59, /70, RR 18, 98% on RA  Labs: wbc 14.38, Hb 11.9, BUN/Cr 38/1.75 (unknown baseline), glu 285, U/A positive for protein, wbc 5-10  CXR: no acute infiltrates  EKG: sinus bradycardia, no acute ischemic changes  CTH: b/l chronic b/l subdural collections/hygromas  ED management: ceftriaxone x1 and 1 L NS     used   Dx: Was diagnosed with diabetes 1-2 years ago by PCP.  Hx and duration of insulin: never used insulin or told that he needed it  Current Therapy: Per surescripts- Glipizide 5mg ER, Jardiance 10mg, Metformin 500mg BID  Hx of hypoglycemia- never hypoglycemic  Hx of DKA/HHS? none    Home FSG:  Fasting 300-500      Diet: mainly eats rice, noodles and bread    Hx of other regimens  Complications: +neuropathy, goes to eye doctor yearly unknown retinopathy   Outpatient Endo: Does not have one only has family doctor    PMH   Diabetes mellitus  HTN (hypertension)  Anemia  Nutrition, metabolism, and development symptoms  BPH (benign prostatic hyperplasia)      FH: no known family history     SH: non smoker, no alcohol use, no illicit drug use      Current Meds:  acetaminophen     Tablet .. 650 milliGRAM(s) Oral every 6 hours PRN  atorvastatin 40 milliGRAM(s) Oral at bedtime  cefTRIAXone   IVPB 1000 milliGRAM(s) IV Intermittent every 24 hours  dextrose 40% Gel 15 Gram(s) Oral once  dextrose 5%. 1000 milliLiter(s) IV Continuous <Continuous>  dextrose 50% Injectable 25 Gram(s) IV Push once  enoxaparin Injectable 40 milliGRAM(s) SubCutaneous every 24 hours  finasteride 5 milliGRAM(s) Oral daily  glucagon  Injectable 1 milliGRAM(s) IntraMuscular once  influenza  Vaccine (HIGH DOSE) 0.7 milliLiter(s) IntraMuscular once  insulin glargine Injectable (LANTUS) 10 Unit(s) SubCutaneous at bedtime  insulin lispro (ADMELOG) corrective regimen sliding scale   SubCutaneous Before meals and at bedtime  insulin lispro Injectable (ADMELOG) 3 Unit(s) SubCutaneous three times a day before meals  lactated ringers Bolus 1000 milliLiter(s) IV Bolus once  polyethylene glycol 3350 17 Gram(s) Oral daily  senna 2 Tablet(s) Oral at bedtime      Allergies:  No Known Allergies      ROS:  Denies the following except as indicated.    General: +weight loss, decreased appetite, +fatigue  Eyes: Blurry vision, double vision, visual changes  ENT: Throat pain, changes in voice,   CV: palpitations, SOB, CP, cough  GI: NVD, difficulty swallowing, abdominal pain  : +polyuria, dysuria  Endo: abnormal menses, temperature intolerance, decreased libido  MSK: +weakness, joint pain  Skin: rash, dryness, diaphoresis  Heme: Easy bruising,bleeding  Neuro: HA, dizziness, lightheadedness, +numbness tingling  Psych: Anxiety, Depression    Vital Signs Last 24 Hrs  T(C): 36.8 (16 Nov 2021 10:01), Max: 36.9 (15 Nov 2021 20:53)  T(F): 98.3 (16 Nov 2021 10:01), Max: 98.5 (15 Nov 2021 20:53)  HR: 64 (16 Nov 2021 05:44) (57 - 74)  BP: 120/72 (16 Nov 2021 05:44) (120/72 - 148/70)  BP(mean): --  RR: 18 (16 Nov 2021 10:01) (16 - 18)  SpO2: 96% (16 Nov 2021 10:01) (96% - 98%)    Weight (kg): 61.2 (11-15 @ 15:17)      Constitutional: thin elderly male, NAD.   HEENT: NCAT, MMM, OP clear, EOMI, , no proptosis or lid retraction  Neck: no thyromegaly or palpable thyroid nodules   Respiratory: lungs CTAB.  Cardiovascular: regular rhythm, normal S1 and S2, no audible murmurs, no peripheral edema  GI: soft, NT/ND, no masses/HSM appreciated.  Neurology: no tremors, DTR 2+  Skin: no visible rashes/lesions  Psychiatric: AAO x 3, normal affect/mood.  Ext: radial pulses intact, DP pulses intact, extremities warm, no cyanosis, clubbing or edema.       LABS:                        11.1   11.55 )-----------( 178      ( 16 Nov 2021 06:14 )             34.5     11-16    136  |  102  |  32<H>  ----------------------------<  324<H>  4.4   |  20<L>  |  1.27    Ca    9.6      16 Nov 2021 06:14  Phos  4.4     11-16  Mg     2.3     11-16    TPro  8.6<H>  /  Alb  3.3<L>  /  TBili  0.6  /  DBili  x   /  AST  30  /  ALT  14  /  AlkPhos  73  11-15      Urinalysis Basic - ( 15 Nov 2021 17:11 )    Color: Yellow / Appearance: Clear / SG: >=1.030 / pH: x  Gluc: x / Ketone: 15 mg/dL  / Bili: NEGATIVE / Urobili: 0.2 E.U./dL   Blood: x / Protein: >=300 mg/dL / Nitrite: POSITIVE   Leuk Esterase: NEGATIVE / RBC: > 10 /HPF / WBC 5-10 /HPF   Sq Epi: x / Non Sq Epi: 0-5 /HPF / Bacteria: Present /HPF        Thyroid Stimulating Hormone, Serum: 0.842 (11-16 @ 06:14)      RADIOLOGY & ADDITIONAL STUDIES:  CAPILLARY BLOOD GLUCOSE      POCT Blood Glucose.: 281 mg/dL (16 Nov 2021 09:05)  POCT Blood Glucose.: 294 mg/dL (15 Nov 2021 16:14)        A/P:89 y/o M w/ pmhx of HTN, DM, BPH presenting after fall today. Pt admitted for sepsis 2/2 to possible UTI and PT eval.  A1C: >20%  Wt: 61.2kg  Cr: 1.27  GFR: 49  Home regimen: Per surescripts : Jardiance 10mg, Glipizide 5mg ER, Metformin 500mg BID  1.  DM type 2- uncontrolled , questionable type 1 late onset diabetes  Complaint with medication however likely no longer working  f/u c-peptide level  Would check TEDDY antibodies, and islet cell antibodies  Please continue lantus   12  units at night.    Please continue lispro   8   units before each meal.    Please continue lispro moderate dose sliding scale four times daily with meals and at bedtime    Pt's fingerstick glucose goal is 100-180    Patient will need insulin and diabetes teaching, CDE consulted     Will continue to monitor       Pt can follow up at discharge with [facility] by calling  to make an appointment.   Will discuss case with Dr. Earl   and update primary team

## 2021-11-16 NOTE — PROGRESS NOTE ADULT - PROBLEM SELECTOR PLAN 3
Presented with A1c>20 and glucosuria. Additionally had ketonuria but no acidosis, likely 2/2 to dehydration and reduced PO intake. Has only had DM a few years but unlikely pancreatic mass as patient not jaunediced not exhibiting other signs of pancreatic mass. On metformin and jardiance at home. Suspect possible DM1  - start basal bolus regimen to continue at home  - 12U lantus and 8U premeal  - f/u DM1 ab labs  - endo consulted appreciate recs

## 2021-11-16 NOTE — DISCHARGE NOTE PROVIDER - HOSPITAL COURSE
#Discharge: do not delete    89 y/o M w/ pmhx of HTN, DM, BPH presents with weakness and admitted for sepsis 2/2 UTI and found to have uncontrolled DM.      Problem List/Main Diagnoses (system-based):     #Fall.   Patient felt weak and steadied himself to the ground. Likely weak in setting of sepsis and dehydration (see below). SIRS+ w urinary complaints and positive UA, high urine spec grav and dry on exam. Patient did not hit head, CT showed no bleed or acute infarction, no focal deficit. No metabolic derangement to explain weakness or fall. No medication to explain weakness. Improvement in weakness after fluid resuscitation and starting abx. Orthostats negative. Treated UTI per plan below and worked with PT      #Sepsis.   SIRS 2/4 hypothermic, leukocytosis w source UTI. No pulmonary complaints or other source of infxn. Pt fluid resuscitated and started on CTX. WBC downtrending, temp normal. Weakness resolved.    #DM (diabetes mellitus).   Presented with A1c>20 and glucosuria. Additionally had ketonuria but no acidosis, likely 2/2 to dehydration and reduced PO intake. Has only had DM a few years but unlikely pancreatic mass as patient not jaunediced not exhibiting other signs of pancreatic mass. On metformin and jardiance at home. Suspect possible DM1. Endocrine followed recommended 12U basal and 8U premeal. Ordered DM antibody labs to be followed as outpatient.    #YOVANI (acute kidney injury).   Likely pre renal as patient rapidly improved w fluid resuscitation and pt dry on exam w high spec grav. Pt did have proteinuria, could be diabetic nephropathy, possibility for a nephrotic syndrome though pt not edematous w albuin in normal range. Treated sepsis and will f.u outpatient with endocrinology.    #Acute UTI.   Pt has complaints of dysuria w hx of BPH and uses jardiance. UA positive for WBC and presented w sepsis. CTX    #Anemia.   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 Normocytic w low TIBC on iron studies. Although pt complains of dark stools, no blood, and patient has had dark stools for years. Low suspicion for bleed.  No intervention.    #BPH (benign prostatic hyperplasia).   On finasteride at home. Started on tamsulosin      Inpatient treatment course:   New medications:   Labs to be followed outpatient:   Exam to be followed outpatient:    #Discharge: do not delete    89 y/o M w/ pmhx of HTN, DM, BPH presents with weakness and admitted for sepsis 2/2 UTI and found to have uncontrolled DM with A1c around 12.9. Endocrinology was consulted and patient was started on Insulin and Insulin education was performed with patient and his family. Pt started on 12 units Lispro TID pre-meal and 12 units Lantus basal insulin at bedtime per Endocrine recommendations. Patient's urine culture was positive for Coagulase Negative Staphylococcus Multiple Morphological strains. Patient was started on Ceftriaxone 1g IV q24 from 11/15/21 -- will be transitioned to cefpodoxime on discharge. Patient is stable  for discharge     Problem List/Main Diagnoses (system-based):     #Fall.   Patient felt weak and steadied himself to the ground. Likely weak in setting of sepsis and dehydration (see below). SIRS+ w urinary complaints and positive UA, high urine spec grav and dry on exam. Patient did not hit head, CT showed no bleed or acute infarction, no focal deficit. No metabolic derangement to explain weakness or fall. No medication to explain weakness. Improvement in weakness after fluid resuscitation and starting abx. Orthostats negative. Treated UTI per plan below and worked with PT      #Sepsis.   SIRS 2/4 hypothermic, leukocytosis w source UTI. No pulmonary complaints or other source of infxn. Pt fluid resuscitated and started on CTX. WBC downtrending, temp normal. Weakness resolved.    #DM (diabetes mellitus).   Presented with A1c>20 and glucosuria. Additionally had ketonuria but no acidosis, likely 2/2 to dehydration and reduced PO intake. Has only had DM a few years but unlikely pancreatic mass as patient not jaunediced not exhibiting other signs of pancreatic mass. On metformin and jardiance at home. Suspect possible DM1. Endocrine followed recommended 12U basal and 8U premeal. Ordered DM antibody labs to be followed as outpatient.    #YOVANI (acute kidney injury).   Likely pre renal as patient rapidly improved w fluid resuscitation and pt dry on exam w high spec grav. Pt did have proteinuria, could be diabetic nephropathy, possibility for a nephrotic syndrome though pt not edematous w albuin in normal range. Treated sepsis and will f.u outpatient with endocrinology.    #Acute UTI.   Pt has complaints of dysuria w hx of BPH and uses jardiance. UA positive for WBC and presented w sepsis. CTX    #Anemia.   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 Normocytic w low TIBC on iron studies. Although pt complains of dark stools, no blood, and patient has had dark stools for years. Low suspicion for bleed.  No intervention.    #BPH (benign prostatic hyperplasia).   On finasteride at home. Started on tamsulosin      Inpatient treatment course:   New medications:   Labs to be followed outpatient:   Exam to be followed outpatient:    #Discharge: do not delete    89 y/o M w/ pmhx of HTN, DM, BPH presents with weakness and admitted for sepsis 2/2 UTI and found to have uncontrolled DM with A1c around 12.9. Endocrinology was consulted and patient was started on Insulin and Insulin education was performed with patient and his family. Pt started on 12 units Lispro TID pre-meal and 12 units Lantus basal insulin at bedtime per Endocrine recommendations. Patient's urine culture was positive for Coagulase Negative Staphylococcus Multiple Morphological strains. Patient was started on Ceftriaxone 1g IV q24 from 11/15/21 -- will be transitioned to cefpodoxime on discharge. Patient is stable  for discharge     Problem List/Main Diagnoses (system-based):   #Fall.   Patient felt weak and steadied himself to the ground. Likely weak in setting of sepsis and dehydration (see below). SIRS+ w urinary complaints and positive UA, high urine spec grav and dry on exam. Patient did not hit head, CT showed no bleed or acute infarction, no focal deficit. No metabolic derangement to explain weakness or fall. No medication to explain weakness. Improvement in weakness after fluid resuscitation and starting abx. Orthostats negative. Treated UTI per plan below and worked with PT    #Sepsis.   SIRS 2/4 hypothermic, leukocytosis w source UTI. No pulmonary complaints or other source of infxn. Pt fluid resuscitated and started on CTX. WBC downtrending, temp normal. Weakness resolved.    #DM (diabetes mellitus).   Presented with A1c>20 and glucosuria. Additionally had ketonuria but no acidosis, likely 2/2 to dehydration and reduced PO intake. Has only had DM a few years but unlikely pancreatic mass as patient not jaunediced not exhibiting other signs of pancreatic mass. On metformin and jardiance at home. Suspect possible DM1. Endocrine followed recommended 12U basal and 8U premeal. Ordered DM antibody labs to be followed as outpatient.    #YOVANI (acute kidney injury).   Likely pre renal as patient rapidly improved w fluid resuscitation and pt dry on exam w high spec grav. Pt did have proteinuria, could be diabetic nephropathy, possibility for a nephrotic syndrome though pt not edematous w albuin in normal range. Treated sepsis and will f.u outpatient with endocrinology.      #Acute UTI.   Pt has complaints of dysuria w hx of BPH and uses jardiance. UA positive for WBC and presented w sepsis. CTX    #Anemia.   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 Normocytic w low TIBC on iron studies. Although pt complains of dark stools, no blood, and patient has had dark stools for years. Low suspicion for bleed.  No intervention indicated at this hospitalization or upon discharge.     #BPH (benign prostatic hyperplasia).   On finasteride at home. Started on tamsulosin  Continue with both medications upon discharge.       Inpatient treatment course:   New medications:   Labs to be followed outpatient:   Exam to be followed outpatient:    89 y/o M w/ pmhx of HTN, DM, BPH presents with weakness and admitted for sepsis 2/2 UTI and found to have uncontrolled DM with A1c around 12.9. Endocrinology was consulted and patient was started on Insulin and Insulin education was performed with patient and his family. Pt started on 12 units Lispro TID pre-meal and 12 units Lantus basal insulin at bedtime per Endocrine recommendations. Patient's urine culture was positive for Coagulase Negative Staphylococcus Multiple Morphological strains. Patient was started on Ceftriaxone 1g IV q24 from 11/15/21 -- will be transitioned to cefpodoxime on discharge. Patient is stable  for discharge     Problem List/Main Diagnoses (system-based):     # Mechanical Fall.   Patient felt weak and steadied himself to the ground. Likely weak in setting of sepsis and dehydration (see below). SIRS+ w urinary complaints and positive UA, high urine spec grav and dry on exam. Patient did not hit head, CT showed no bleed or acute infarction, no focal deficit. No metabolic derangement to explain weakness or fall. No medication to explain weakness. Improvement in weakness after fluid resuscitation and starting abx. Orthostats negative. Treated UTI per plan below and worked with PT -- per PT, patient to be DCed with     #Sepsis.   SIRS 2/4 hypothermic, leukocytosis w source UTI. No pulmonary complaints or other source of infxn. Pt fluid resuscitated and started on CTX. WBC downtrending, temp normal. Weakness resolved.    #DM (diabetes mellitus).   Presented with A1c>20 and glucosuria. Additionally had ketonuria but no acidosis, likely 2/2 to dehydration and reduced PO intake. Has only had DM a few years but unlikely pancreatic mass as patient not jaunediced not exhibiting other signs of pancreatic mass. On metformin and jardiance at home. Suspect possible DM1. Endocrine followed recommended 12U basal and 8U premeal. Ordered DM antibody labs to be followed as outpatient.    #YOVANI (acute kidney injury).   Likely pre renal as patient rapidly improved w fluid resuscitation and pt dry on exam w high spec grav. Pt did have proteinuria, could be diabetic nephropathy, possibility for a nephrotic syndrome though pt not edematous w albuin in normal range. Treated sepsis and will f.u outpatient with endocrinology.      #Acute UTI.   Pt has complaints of dysuria w hx of BPH and uses jardiance. UA positive for WBC and presented w sepsis. CTX    #Anemia.   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 Normocytic w low TIBC on iron studies. Although pt complains of dark stools, no blood, and patient has had dark stools for years. Low suspicion for bleed.  No intervention indicated at this hospitalization or upon discharge.     #BPH (benign prostatic hyperplasia).   On finasteride at home. Started on tamsulosin  Continue with both medications upon discharge.       Inpatient treatment course:   New medications:   Labs to be followed outpatient:   Exam to be followed outpatient:    89 y/o M w/ pmhx of HTN, DM, BPH presents with weakness and admitted for sepsis 2/2 UTI and found to have uncontrolled DM with A1c around 12.9. Endocrinology was consulted and patient was started on Insulin and Insulin education was performed with patient and his family. Pt started on 12 units Lispro TID pre-meal and 12 units Lantus basal insulin at bedtime per Endocrine recommendations. Patient's urine culture was positive for Coagulase Negative Staphylococcus Multiple Morphological strains. Patient was started on Ceftriaxone 1g IV q24 from 11/15/21 -- will be transitioned to cefpodoxime on discharge. Patient is stable  for discharge     Problem List/Main Diagnoses (system-based):     # Mechanical Fall.   Patient felt weak and steadied himself to the ground. Likely weak in setting of sepsis and dehydration (see below). SIRS+ w urinary complaints and positive UA, high urine spec grav and dry on exam. Patient did not hit head, CT showed no bleed or acute infarction, no focal deficit. No metabolic derangement to explain weakness or fall. No medication to explain weakness. Improvement in weakness after fluid resuscitation and starting abx. Orthostats negative. Treated UTI per plan below and worked with PT -- per PT, patient to be DCed with     #Sepsis.   SIRS 2/4 hypothermic, leukocytosis w source UTI. No pulmonary complaints or other source of infxn. Pt fluid resuscitated and started on CTX. WBC downtrending, temp normal. Weakness resolved.    #DM (diabetes mellitus).   Presented with A1c>20 and glucosuria. Additionally had ketonuria but no acidosis, likely 2/2 to dehydration and reduced PO intake. Has only had DM a few years but unlikely pancreatic mass as patient not jaunediced not exhibiting other signs of pancreatic mass. On metformin and jardiance at home. Suspect possible DM1. Endocrine followed recommended 12U basal and 8U premeal. Ordered DM antibody labs to be followed as outpatient.    #YOVANI (acute kidney injury).   Likely pre renal as patient rapidly improved w fluid resuscitation and pt dry on exam w high spec grav. Pt did have proteinuria, could be diabetic nephropathy, possibility for a nephrotic syndrome though pt not edematous w albuin in normal range. Treated sepsis and will f.u outpatient with endocrinology.      #Acute UTI.   Pt has complaints of dysuria w hx of BPH and uses jardiance. UA positive for WBC and presented w sepsis. CTX    #Anemia.   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 Normocytic w low TIBC on iron studies. Although pt complains of dark stools, no blood, and patient has had dark stools for years. Low suspicion for bleed.  No intervention indicated at this hospitalization or upon discharge.     #BPH (benign prostatic hyperplasia).   On finasteride at home. Started on tamsulosin  Continue with both medications upon discharge.       Patient to f/u with Endocrinology Provider, Dr. Gallito Keenan at [address], [address] [address] on 12/15/2021 at 1:00pm.  Patient to f/u with Urology Provider, Dr. David Guillermo at [address], [address] [address] on 12/02/2021 at 1:00pm.

## 2021-11-16 NOTE — PHYSICAL THERAPY INITIAL EVALUATION ADULT - PERTINENT HX OF CURRENT PROBLEM, REHAB EVAL
89 y/o M w/ pmhx of HTN, DM, BPH presenting after fall today. Pt admitted for sepsis 2/2 to possible UTI and PT eval.

## 2021-11-17 LAB
ANION GAP SERPL CALC-SCNC: 8 MMOL/L — SIGNIFICANT CHANGE UP (ref 5–17)
ANION GAP SERPL CALC-SCNC: 9 MMOL/L — SIGNIFICANT CHANGE UP (ref 5–17)
BASOPHILS # BLD AUTO: 0.06 K/UL — SIGNIFICANT CHANGE UP (ref 0–0.2)
BASOPHILS NFR BLD AUTO: 0.8 % — SIGNIFICANT CHANGE UP (ref 0–2)
BUN SERPL-MCNC: 35 MG/DL — HIGH (ref 7–23)
BUN SERPL-MCNC: 38 MG/DL — HIGH (ref 7–23)
C PEPTIDE SERPL-MCNC: 1.1 NG/ML — SIGNIFICANT CHANGE UP (ref 1.1–4.4)
CALCIUM SERPL-MCNC: 9.2 MG/DL — SIGNIFICANT CHANGE UP (ref 8.4–10.5)
CALCIUM SERPL-MCNC: 9.2 MG/DL — SIGNIFICANT CHANGE UP (ref 8.4–10.5)
CHLORIDE SERPL-SCNC: 105 MMOL/L — SIGNIFICANT CHANGE UP (ref 96–108)
CHLORIDE SERPL-SCNC: 105 MMOL/L — SIGNIFICANT CHANGE UP (ref 96–108)
CO2 SERPL-SCNC: 22 MMOL/L — SIGNIFICANT CHANGE UP (ref 22–31)
CO2 SERPL-SCNC: 23 MMOL/L — SIGNIFICANT CHANGE UP (ref 22–31)
CREAT SERPL-MCNC: 1.53 MG/DL — HIGH (ref 0.5–1.3)
CREAT SERPL-MCNC: 1.61 MG/DL — HIGH (ref 0.5–1.3)
CULTURE RESULTS: SIGNIFICANT CHANGE UP
EOSINOPHIL # BLD AUTO: 0.16 K/UL — SIGNIFICANT CHANGE UP (ref 0–0.5)
EOSINOPHIL NFR BLD AUTO: 2 % — SIGNIFICANT CHANGE UP (ref 0–6)
FOLATE SERPL-MCNC: >20 NG/ML — SIGNIFICANT CHANGE UP
GLUCOSE BLDC GLUCOMTR-MCNC: 127 MG/DL — HIGH (ref 70–99)
GLUCOSE BLDC GLUCOMTR-MCNC: 141 MG/DL — HIGH (ref 70–99)
GLUCOSE BLDC GLUCOMTR-MCNC: 189 MG/DL — HIGH (ref 70–99)
GLUCOSE BLDC GLUCOMTR-MCNC: 240 MG/DL — HIGH (ref 70–99)
GLUCOSE BLDC GLUCOMTR-MCNC: 299 MG/DL — HIGH (ref 70–99)
GLUCOSE BLDC GLUCOMTR-MCNC: 338 MG/DL — HIGH (ref 70–99)
GLUCOSE SERPL-MCNC: 267 MG/DL — HIGH (ref 70–99)
GLUCOSE SERPL-MCNC: 302 MG/DL — HIGH (ref 70–99)
HCT VFR BLD CALC: 32.5 % — LOW (ref 39–50)
HGB BLD-MCNC: 10.6 G/DL — LOW (ref 13–17)
IMM GRANULOCYTES NFR BLD AUTO: 0.4 % — SIGNIFICANT CHANGE UP (ref 0–1.5)
LYMPHOCYTES # BLD AUTO: 1.59 K/UL — SIGNIFICANT CHANGE UP (ref 1–3.3)
LYMPHOCYTES # BLD AUTO: 20.2 % — SIGNIFICANT CHANGE UP (ref 13–44)
MAGNESIUM SERPL-MCNC: 2.1 MG/DL — SIGNIFICANT CHANGE UP (ref 1.6–2.6)
MCHC RBC-ENTMCNC: 31.3 PG — SIGNIFICANT CHANGE UP (ref 27–34)
MCHC RBC-ENTMCNC: 32.6 GM/DL — SIGNIFICANT CHANGE UP (ref 32–36)
MCV RBC AUTO: 95.9 FL — SIGNIFICANT CHANGE UP (ref 80–100)
MONOCYTES # BLD AUTO: 0.62 K/UL — SIGNIFICANT CHANGE UP (ref 0–0.9)
MONOCYTES NFR BLD AUTO: 7.9 % — SIGNIFICANT CHANGE UP (ref 2–14)
NEUTROPHILS # BLD AUTO: 5.4 K/UL — SIGNIFICANT CHANGE UP (ref 1.8–7.4)
NEUTROPHILS NFR BLD AUTO: 68.7 % — SIGNIFICANT CHANGE UP (ref 43–77)
NRBC # BLD: 0 /100 WBCS — SIGNIFICANT CHANGE UP (ref 0–0)
PLATELET # BLD AUTO: 153 K/UL — SIGNIFICANT CHANGE UP (ref 150–400)
POTASSIUM SERPL-MCNC: 4.2 MMOL/L — SIGNIFICANT CHANGE UP (ref 3.5–5.3)
POTASSIUM SERPL-MCNC: 4.3 MMOL/L — SIGNIFICANT CHANGE UP (ref 3.5–5.3)
POTASSIUM SERPL-SCNC: 4.2 MMOL/L — SIGNIFICANT CHANGE UP (ref 3.5–5.3)
POTASSIUM SERPL-SCNC: 4.3 MMOL/L — SIGNIFICANT CHANGE UP (ref 3.5–5.3)
RBC # BLD: 3.39 M/UL — LOW (ref 4.2–5.8)
RBC # FLD: 13.4 % — SIGNIFICANT CHANGE UP (ref 10.3–14.5)
SODIUM SERPL-SCNC: 136 MMOL/L — SIGNIFICANT CHANGE UP (ref 135–145)
SODIUM SERPL-SCNC: 136 MMOL/L — SIGNIFICANT CHANGE UP (ref 135–145)
SPECIMEN SOURCE: SIGNIFICANT CHANGE UP
VIT B12 SERPL-MCNC: 481 PG/ML — SIGNIFICANT CHANGE UP (ref 232–1245)
WBC # BLD: 7.86 K/UL — SIGNIFICANT CHANGE UP (ref 3.8–10.5)
WBC # FLD AUTO: 7.86 K/UL — SIGNIFICANT CHANGE UP (ref 3.8–10.5)

## 2021-11-17 PROCEDURE — 99232 SBSQ HOSP IP/OBS MODERATE 35: CPT | Mod: GC

## 2021-11-17 RX ORDER — INSULIN GLARGINE 100 [IU]/ML
8 INJECTION, SOLUTION SUBCUTANEOUS ONCE
Refills: 0 | Status: COMPLETED | OUTPATIENT
Start: 2021-11-17 | End: 2021-11-17

## 2021-11-17 RX ORDER — INSULIN GLARGINE 100 [IU]/ML
16 INJECTION, SOLUTION SUBCUTANEOUS AT BEDTIME
Refills: 0 | Status: DISCONTINUED | OUTPATIENT
Start: 2021-11-18 | End: 2021-11-18

## 2021-11-17 RX ORDER — INSULIN LISPRO 100/ML
12 VIAL (ML) SUBCUTANEOUS
Refills: 0 | Status: DISCONTINUED | OUTPATIENT
Start: 2021-11-17 | End: 2021-11-18

## 2021-11-17 RX ORDER — TAMSULOSIN HYDROCHLORIDE 0.4 MG/1
0.4 CAPSULE ORAL AT BEDTIME
Refills: 0 | Status: DISCONTINUED | OUTPATIENT
Start: 2021-11-17 | End: 2021-11-18

## 2021-11-17 RX ORDER — INSULIN GLARGINE 100 [IU]/ML
16 INJECTION, SOLUTION SUBCUTANEOUS AT BEDTIME
Refills: 0 | Status: DISCONTINUED | OUTPATIENT
Start: 2021-11-17 | End: 2021-11-17

## 2021-11-17 RX ADMIN — Medication 8 UNIT(S): at 09:00

## 2021-11-17 RX ADMIN — Medication 4: at 17:37

## 2021-11-17 RX ADMIN — INSULIN GLARGINE 8 UNIT(S): 100 INJECTION, SOLUTION SUBCUTANEOUS at 23:06

## 2021-11-17 RX ADMIN — FINASTERIDE 5 MILLIGRAM(S): 5 TABLET, FILM COATED ORAL at 11:17

## 2021-11-17 RX ADMIN — Medication 8 UNIT(S): at 12:18

## 2021-11-17 RX ADMIN — CEFTRIAXONE 100 MILLIGRAM(S): 500 INJECTION, POWDER, FOR SOLUTION INTRAMUSCULAR; INTRAVENOUS at 18:06

## 2021-11-17 RX ADMIN — POLYETHYLENE GLYCOL 3350 17 GRAM(S): 17 POWDER, FOR SOLUTION ORAL at 12:19

## 2021-11-17 RX ADMIN — Medication 6: at 15:20

## 2021-11-17 RX ADMIN — ATORVASTATIN CALCIUM 40 MILLIGRAM(S): 80 TABLET, FILM COATED ORAL at 22:54

## 2021-11-17 RX ADMIN — TAMSULOSIN HYDROCHLORIDE 0.4 MILLIGRAM(S): 0.4 CAPSULE ORAL at 22:54

## 2021-11-17 RX ADMIN — ENOXAPARIN SODIUM 40 MILLIGRAM(S): 100 INJECTION SUBCUTANEOUS at 22:54

## 2021-11-17 NOTE — PROGRESS NOTE ADULT - SUBJECTIVE AND OBJECTIVE BOX
INTERVAL HPI/OVERNIGHT EVENTS:    Patient is a 89y old  Male who presents with a chief complaint of fall (2021 07:53)  Patient family here today at bedside, daughter and granddaughter. They went through insulin teaching. Patient is hesitant and resistant to use insulin but with the care and help with family may be able to manage.   Granddaughter was able to get recent A1C done by PCP in 2021, which was 7.8%.   Patient is eating well, reports not having breakfast but ate fish and rice for lunch.   FSG & Insulin received:    Yesterday:  pre-dinner fs   nutritional lispro 8  units + 4  units lispro SS  bedtime fs  lantus 12  units + 0   units lispro SS    Today:  pre-breakfast fs  nutritional lispro 8  units+  0  units lispro SS  pre-lunch fsg:?????  nutritional lispro 8  units+ 0  units lispro SS  Postprandial 299- 8 units lispro 22     Pt reports the following symptoms:    CONSTITUTIONAL:  Negative fever or chills, feels well, good appetite  EYES:  Negative  blurry vision or double vision  CARDIOVASCULAR:  Negative for chest pain or palpitations  RESPIRATORY:  Negative for cough, wheezing, or SOB   GASTROINTESTINAL:  Negative for nausea, vomiting, diarrhea, constipation, or abdominal pain  GENITOURINARY:  Negative frequency, urgency or dysuria  NEUROLOGIC:  No headache, confusion, dizziness, lightheadedness    MEDICATIONS  (STANDING):  atorvastatin 40 milliGRAM(s) Oral at bedtime  cefTRIAXone   IVPB 1000 milliGRAM(s) IV Intermittent every 24 hours  dextrose 40% Gel 15 Gram(s) Oral once  dextrose 5%. 1000 milliLiter(s) (50 mL/Hr) IV Continuous <Continuous>  dextrose 50% Injectable 25 Gram(s) IV Push once  enoxaparin Injectable 40 milliGRAM(s) SubCutaneous every 24 hours  finasteride 5 milliGRAM(s) Oral daily  glucagon  Injectable 1 milliGRAM(s) IntraMuscular once  influenza  Vaccine (HIGH DOSE) 0.7 milliLiter(s) IntraMuscular once  insulin glargine Injectable (LANTUS) 16 Unit(s) SubCutaneous at bedtime  insulin lispro (ADMELOG) corrective regimen sliding scale   SubCutaneous Before meals and at bedtime  insulin lispro Injectable (ADMELOG) 12 Unit(s) SubCutaneous three times a day before meals  polyethylene glycol 3350 17 Gram(s) Oral daily  senna 2 Tablet(s) Oral at bedtime  tamsulosin 0.4 milliGRAM(s) Oral at bedtime    MEDICATIONS  (PRN):  acetaminophen     Tablet .. 650 milliGRAM(s) Oral every 6 hours PRN Temp greater or equal to 38C (100.4F), Mild Pain (1 - 3)      PHYSICAL EXAM  Vital Signs Last 24 Hrs  T(C): 36.7 (2021 17:), Max: 37.1 (2021 21:12)  T(F): 98 (:), Max: 98.7 (2021 21:12)  HR: 83 (:) (62 - 83)  BP: 133/71 (:) (111/64 - 139/74)  BP(mean): --  RR: 19 (:) (18 - 19)  SpO2: 99% (:) (94% - 99%)    Constitutional: thin elderly male, NAD.   HEENT: NCAT, MMM, OP clear, EOMI, , no proptosis or lid retraction  Neck: no thyromegaly or palpable thyroid nodules   Respiratory: lungs CTAB.  Cardiovascular: regular rhythm, normal S1 and S2, no audible murmurs, no peripheral edema  GI: soft, NT/ND, no masses/HSM appreciated.  Neurology: no tremors, DTR 2+  Skin: no visible rashes/lesions  Psychiatric: AAO x 3, normal affect/mood.  Ext: radial pulses intact, DP pulses intact, extremities warm, no cyanosis, clubbing or edema.  LABS:                        10.6   7.86  )-----------( 153      ( 2021 08:53 )             32.5     -    136  |  105  |  38<H>  ----------------------------<  302<H>  4.2   |  23  |  1.53<H>    Ca    9.2      2021 17:12  Phos  4.4     -16  Mg     2.1               Thyroid Stimulating Hormone, Serum: 0.842 uIU/mL (- @ 06:14)      HbA1C:         Insulin Sliding Scale requirements X 24 Hours:      RADIOLOGY & ADDITIONAL TESTS:      A/P:89 y/o M w/ pmhx of HTN, DM, BPH presenting after fall today. Pt admitted for sepsis 2/2 to possible UTI and PT eval.  A1C: >20%  Wt: 61.2kg  Cr: 1.27  GFR: 49  Home regimen: Per surescripts : Jardiance 10mg, Glipizide 5mg ER, Metformin 500mg BID  1.  DM type 2- uncontrolled , questionable type 1 late onset diabetes  f/u c-peptide level  f/u TEDDY antibodies, and islet cell antibodies  Repeat A1C level, last A1C outpatient in 2021 reported to be 7.8%  May need to assess for additional causes of rapid increases in A1C and loss of insulin reserves   Please continue lantus   12  units at night.    Please continue lispro   12  units before each meal.    Please continue lispro moderate dose sliding scale four times daily with meals and at bedtime    Pt's fingerstick glucose goal is 100-180    Patient has had insulin and diabetes teaching, CDE consulted   Large concern for safety with insulin use- family is willing to help     Will continue to monitor       Pt can follow up at discharge with [facility] by calling  to make an appointment.   Will discuss case with Dr. Earl  and update primary team

## 2021-11-17 NOTE — PROGRESS NOTE ADULT - SUBJECTIVE AND OBJECTIVE BOX
MARIA DOLORES GRANADO, 89y, Male  MRN-[id number]  Patient is a 89y old  Male who presents with a chief complaint of fall (16 Nov 2021 21:52)      OVERNIGHT EVENTS:     SUBJECTIVE:    12 Point ROS Negative unless noted otherwise above.  -------------------------------------------------------------------------------  VITAL SIGNS:  Vital Signs Last 24 Hrs  T(C): 36.9 (17 Nov 2021 05:53), Max: 37.1 (16 Nov 2021 21:12)  T(F): 98.5 (17 Nov 2021 05:53), Max: 98.7 (16 Nov 2021 21:12)  HR: 64 (17 Nov 2021 05:53) (64 - 77)  BP: 139/74 (17 Nov 2021 05:53) (110/71 - 139/74)  BP(mean): --  RR: 19 (17 Nov 2021 05:53) (18 - 19)  SpO2: 98% (17 Nov 2021 05:53) (94% - 99%)  I&O's Summary    16 Nov 2021 07:01  -  17 Nov 2021 07:00  --------------------------------------------------------  IN: 1050 mL / OUT: 0 mL / NET: 1050 mL        PHYSICAL EXAM:    General: NAD, well developed  HEENT: NC/AT; EOMI, PERRLA, anicteric sclera; moist mucosal membranes.  Neck: supple, trachea midline  Cardiovascular: RRR, +S1/S2; NO M/R/G  Respiratory: CTA B/L; no W/R/R  Gastrointestinal: soft, NT/ND; +BSx4  Extremities: WWP; no edema or cyanosis  Vascular: 2+ radial, DP/PT pulses B/L  Neurological: AAOx3; no focal deficits    ALLERGIES:  Allergies    No Known Allergies    Intolerances        MEDICATIONS:  MEDICATIONS  (STANDING):  atorvastatin 40 milliGRAM(s) Oral at bedtime  cefTRIAXone   IVPB 1000 milliGRAM(s) IV Intermittent every 24 hours  dextrose 40% Gel 15 Gram(s) Oral once  dextrose 5%. 1000 milliLiter(s) (50 mL/Hr) IV Continuous <Continuous>  dextrose 50% Injectable 25 Gram(s) IV Push once  enoxaparin Injectable 40 milliGRAM(s) SubCutaneous every 24 hours  finasteride 5 milliGRAM(s) Oral daily  glucagon  Injectable 1 milliGRAM(s) IntraMuscular once  influenza  Vaccine (HIGH DOSE) 0.7 milliLiter(s) IntraMuscular once  insulin glargine Injectable (LANTUS) 12 Unit(s) SubCutaneous at bedtime  insulin lispro (ADMELOG) corrective regimen sliding scale   SubCutaneous Before meals and at bedtime  insulin lispro Injectable (ADMELOG) 8 Unit(s) SubCutaneous three times a day before meals  polyethylene glycol 3350 17 Gram(s) Oral daily  senna 2 Tablet(s) Oral at bedtime    MEDICATIONS  (PRN):  acetaminophen     Tablet .. 650 milliGRAM(s) Oral every 6 hours PRN Temp greater or equal to 38C (100.4F), Mild Pain (1 - 3)      -------------------------------------------------------------------------------  LABS:                        11.1   11.55 )-----------( 178      ( 16 Nov 2021 06:14 )             34.5     11-16    136  |  102  |  32<H>  ----------------------------<  324<H>  4.4   |  20<L>  |  1.27    Ca    9.6      16 Nov 2021 06:14  Phos  4.4     11-16  Mg     2.3     11-16    TPro  8.6<H>  /  Alb  3.3<L>  /  TBili  0.6  /  DBili  x   /  AST  30  /  ALT  14  /  AlkPhos  73  11-15    LIVER FUNCTIONS - ( 15 Nov 2021 16:01 )  Alb: 3.3 g/dL / Pro: 8.6 g/dL / ALK PHOS: 73 U/L / ALT: 14 U/L / AST: 30 U/L / GGT: x             Urinalysis Basic - ( 15 Nov 2021 17:11 )    Color: Yellow / Appearance: Clear / SG: >=1.030 / pH: x  Gluc: x / Ketone: 15 mg/dL  / Bili: NEGATIVE / Urobili: 0.2 E.U./dL   Blood: x / Protein: >=300 mg/dL / Nitrite: POSITIVE   Leuk Esterase: NEGATIVE / RBC: > 10 /HPF / WBC 5-10 /HPF   Sq Epi: x / Non Sq Epi: 0-5 /HPF / Bacteria: Present /HPF      CAPILLARY BLOOD GLUCOSE      POCT Blood Glucose.: 150 mg/dL (16 Nov 2021 22:08)      Culture - Blood (collected 16 Nov 2021 07:07)  Source: .Blood Blood  Preliminary Report (16 Nov 2021 20:00):    No growth at 12 hours    Culture - Blood (collected 16 Nov 2021 07:07)  Source: .Blood Blood  Preliminary Report (16 Nov 2021 20:00):    No growth at 12 hours    Culture - Urine (collected 15 Nov 2021 23:10)  Source: Clean Catch Clean Catch (Midstream)  Preliminary Report (17 Nov 2021 04:12):    >100,000 CFU/ml Gram positive organisms      COVID-19 PCR: NotDetec (15 Nov 2021 15:41)      RADIOLOGY & ADDITIONAL TESTS: Reviewed.       MARIA DOLORES GRANADO, 89y, Male  MRN-[id number]  Patient is a 89y old  Male who presents with a chief complaint of fall (16 Nov 2021 21:52)      OVERNIGHT EVENTS: NAEO.     SUBJECTIVE: Pt seen and evaluated at bedside with Jonn  ID [id number]. Pt denied any acute complaints or pain. Denied chest pain, cough, SOB, abdominal pain, flank pain. Admitted to hematuria from previous prostate infection and urinary hesitancy due to his BPH. Pt has not had bowel movement in 2 days. Prior to admission, patient was having bowel movement daily. No acute complaints.     12 Point ROS Negative unless noted otherwise above.  -------------------------------------------------------------------------------  VITAL SIGNS:  Vital Signs Last 24 Hrs  T(C): 36.9 (17 Nov 2021 05:53), Max: 37.1 (16 Nov 2021 21:12)  T(F): 98.5 (17 Nov 2021 05:53), Max: 98.7 (16 Nov 2021 21:12)  HR: 64 (17 Nov 2021 05:53) (64 - 77)  BP: 139/74 (17 Nov 2021 05:53) (110/71 - 139/74)  BP(mean): --  RR: 19 (17 Nov 2021 05:53) (18 - 19)  SpO2: 98% (17 Nov 2021 05:53) (94% - 99%)  I&O's Summary    16 Nov 2021 07:01  -  17 Nov 2021 07:00  --------------------------------------------------------  IN: 1050 mL / OUT: 0 mL / NET: 1050 mL    PHYSICAL EXAM:    General: elderly male, speaking in full sentences, no acute distress noted   HEENT: NC/AT; EOMI, PERRLA, anicteric sclera  Cardiovascular: RRR, +S1/S2; NO M/R/G  Respiratory: CTA B/L; no W/R/R  Gastrointestinal: soft, NT/ND; +BSx4  Extremities: WWP; no edema or cyanosis  Neurological: AAOx3; no focal deficits    ALLERGIES:  Allergies  No Known Allergies  Intolerances    MEDICATIONS:  MEDICATIONS  (STANDING):  atorvastatin 40 milliGRAM(s) Oral at bedtime  cefTRIAXone   IVPB 1000 milliGRAM(s) IV Intermittent every 24 hours  dextrose 40% Gel 15 Gram(s) Oral once  dextrose 5%. 1000 milliLiter(s) (50 mL/Hr) IV Continuous <Continuous>  dextrose 50% Injectable 25 Gram(s) IV Push once  enoxaparin Injectable 40 milliGRAM(s) SubCutaneous every 24 hours  finasteride 5 milliGRAM(s) Oral daily  glucagon  Injectable 1 milliGRAM(s) IntraMuscular once  influenza  Vaccine (HIGH DOSE) 0.7 milliLiter(s) IntraMuscular once  insulin glargine Injectable (LANTUS) 12 Unit(s) SubCutaneous at bedtime  insulin lispro (ADMELOG) corrective regimen sliding scale   SubCutaneous Before meals and at bedtime  insulin lispro Injectable (ADMELOG) 8 Unit(s) SubCutaneous three times a day before meals  polyethylene glycol 3350 17 Gram(s) Oral daily  senna 2 Tablet(s) Oral at bedtime    MEDICATIONS  (PRN):  acetaminophen     Tablet .. 650 milliGRAM(s) Oral every 6 hours PRN Temp greater or equal to 38C (100.4F), Mild Pain (1 - 3)  -------------------------------------------------------------------------------  LABS:                        11.1   11.55 )-----------( 178      ( 16 Nov 2021 06:14 )             34.5     11-16    136  |  102  |  32<H>  ----------------------------<  324<H>  4.4   |  20<L>  |  1.27    Ca    9.6      16 Nov 2021 06:14  Phos  4.4     11-16  Mg     2.3     11-16    TPro  8.6<H>  /  Alb  3.3<L>  /  TBili  0.6  /  DBili  x   /  AST  30  /  ALT  14  /  AlkPhos  73  11-15    LIVER FUNCTIONS - ( 15 Nov 2021 16:01 )  Alb: 3.3 g/dL / Pro: 8.6 g/dL / ALK PHOS: 73 U/L / ALT: 14 U/L / AST: 30 U/L / GGT: x             Urinalysis Basic - ( 15 Nov 2021 17:11 )    Color: Yellow / Appearance: Clear / SG: >=1.030 / pH: x  Gluc: x / Ketone: 15 mg/dL  / Bili: NEGATIVE / Urobili: 0.2 E.U./dL   Blood: x / Protein: >=300 mg/dL / Nitrite: POSITIVE   Leuk Esterase: NEGATIVE / RBC: > 10 /HPF / WBC 5-10 /HPF   Sq Epi: x / Non Sq Epi: 0-5 /HPF / Bacteria: Present /HPF    CAPILLARY BLOOD GLUCOSE    POCT Blood Glucose.: 150 mg/dL (16 Nov 2021 22:08)    Culture - Blood (collected 16 Nov 2021 07:07)  Source: .Blood Blood  Preliminary Report (16 Nov 2021 20:00):    No growth at 12 hours    Culture - Blood (collected 16 Nov 2021 07:07)  Source: .Blood Blood  Preliminary Report (16 Nov 2021 20:00):    No growth at 12 hours    Culture - Urine (collected 15 Nov 2021 23:10)  Source: Clean Catch Clean Catch (Midstream)  Preliminary Report (17 Nov 2021 04:12):    >100,000 CFU/ml Gram positive organisms    COVID-19 PCR: NotDetec (15 Nov 2021 15:41)      RADIOLOGY & ADDITIONAL TESTS: Reviewed.   MARIA DOLORES GRANADO, 89y, Male  MRN-[id number]  Patient is a 89y old  Male who presents with a chief complaint of fall (16 Nov 2021 21:52)      OVERNIGHT EVENTS: NAEO.     SUBJECTIVE: Pt seen and evaluated at bedside with Jonn  ID [id number]. Pt denied any acute complaints or pain. Denied chest pain, cough, SOB, abdominal pain, flank pain. Admitted to hematuria from previous prostate infection and urinary hesitancy due to his BPH. Pt has not had bowel movement in 2 days. Prior to admission, patient was having bowel movement daily. No acute complaints.     12 Point ROS Negative unless noted otherwise above.  -------------------------------------------------------------------------------  VITAL SIGNS:  Vital Signs Last 24 Hrs  T(C): 36.9 (17 Nov 2021 05:53), Max: 37.1 (16 Nov 2021 21:12)  T(F): 98.5 (17 Nov 2021 05:53), Max: 98.7 (16 Nov 2021 21:12)  HR: 64 (17 Nov 2021 05:53) (64 - 77)  BP: 139/74 (17 Nov 2021 05:53) (110/71 - 139/74)  BP(mean): --  RR: 19 (17 Nov 2021 05:53) (18 - 19)  SpO2: 98% (17 Nov 2021 05:53) (94% - 99%)  I&O's Summary    16 Nov 2021 07:01  -  17 Nov 2021 07:00  --------------------------------------------------------  IN: 1050 mL / OUT: 0 mL / NET: 1050 mL    PHYSICAL EXAM:    General: elderly male, speaking in full sentences, no acute distress noted   HEENT: NC/AT; EOMI, PERRLA, anicteric sclera  Cardiovascular: RRR, +S1/S2; NO M/R/G  Respiratory: CTA B/L; no W/R/R  Gastrointestinal: soft, NT/ND; +BSx4  Extremities: WWP; no edema or cyanosis  Neurological: AAOx3; no focal deficits    ALLERGIES:  Allergies  No Known Allergies  Intolerances    MEDICATIONS:  MEDICATIONS  (STANDING):  atorvastatin 40 milliGRAM(s) Oral at bedtime  cefTRIAXone   IVPB 1000 milliGRAM(s) IV Intermittent every 24 hours  dextrose 40% Gel 15 Gram(s) Oral once  dextrose 5%. 1000 milliLiter(s) (50 mL/Hr) IV Continuous <Continuous>  dextrose 50% Injectable 25 Gram(s) IV Push once  enoxaparin Injectable 40 milliGRAM(s) SubCutaneous every 24 hours  finasteride 5 milliGRAM(s) Oral daily  glucagon  Injectable 1 milliGRAM(s) IntraMuscular once  influenza  Vaccine (HIGH DOSE) 0.7 milliLiter(s) IntraMuscular once  insulin glargine Injectable (LANTUS) 12 Unit(s) SubCutaneous at bedtime  insulin lispro (ADMELOG) corrective regimen sliding scale   SubCutaneous Before meals and at bedtime  insulin lispro Injectable (ADMELOG) 8 Unit(s) SubCutaneous three times a day before meals  polyethylene glycol 3350 17 Gram(s) Oral daily  senna 2 Tablet(s) Oral at bedtime    MEDICATIONS  (PRN):  acetaminophen     Tablet .. 650 milliGRAM(s) Oral every 6 hours PRN Temp greater or equal to 38C (100.4F), Mild Pain (1 - 3)  -------------------------------------------------------------------------------  LABS:                        11.1   11.55 )-----------( 178      ( 16 Nov 2021 06:14 )             34.5     11-16    136  |  102  |  32<H>  ----------------------------<  324<H>  4.4   |  20<L>  |  1.27    Ca    9.6      16 Nov 2021 06:14  Phos  4.4     11-16  Mg     2.3     11-16    TPro  8.6<H>  /  Alb  3.3<L>  /  TBili  0.6  /  DBili  x   /  AST  30  /  ALT  14  /  AlkPhos  73  11-15    LIVER FUNCTIONS - ( 15 Nov 2021 16:01 )  Alb: 3.3 g/dL / Pro: 8.6 g/dL / ALK PHOS: 73 U/L / ALT: 14 U/L / AST: 30 U/L / GGT: x             Urinalysis Basic - ( 15 Nov 2021 17:11 )    Color: Yellow / Appearance: Clear / SG: >=1.030 / pH: x  Gluc: x / Ketone: 15 mg/dL  / Bili: NEGATIVE / Urobili: 0.2 E.U./dL   Blood: x / Protein: >=300 mg/dL / Nitrite: POSITIVE   Leuk Esterase: NEGATIVE / RBC: > 10 /HPF / WBC 5-10 /HPF   Sq Epi: x / Non Sq Epi: 0-5 /HPF / Bacteria: Present /HPF    CAPILLARY BLOOD GLUCOSE    POCT Blood Glucose.: 150 mg/dL (16 Nov 2021 22:08)    Culture - Blood (collected 16 Nov 2021 07:07)  Source: .Blood Blood  Preliminary Report (16 Nov 2021 20:00):    No growth at 12 hours    Culture - Blood (collected 16 Nov 2021 07:07)  Source: .Blood Blood  Preliminary Report (16 Nov 2021 20:00):    No growth at 12 hours    Culture - Urine (collected 15 Nov 2021 23:10)  Source: Clean Catch Clean Catch (Midstream)  Preliminary Report (17 Nov 2021 04:12):    >100,000 CFU/ml Gram positive organisms    COVID-19 PCR: NotDetec (15 Nov 2021 15:41)      RADIOLOGY & ADDITIONAL TESTS: Reviewed.

## 2021-11-17 NOTE — PROGRESS NOTE ADULT - PROBLEM SELECTOR PLAN 6
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 Normocytic w low TIBC on iron studies. Although pt complains of dark stools, no blood, and patient has had dark stools for years. Low suspicion for bleed.  - trend h/h  - transfuse below 7
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 Normocytic w low TIBC on iron studies. Although pt complains of dark stools, no blood, and patient has had dark stools for years. Low suspicion for bleed.  - trend h/h  - transfuse below 7

## 2021-11-17 NOTE — PROGRESS NOTE ADULT - PROBLEM SELECTOR PLAN 5
Pt has complaints of dysuria w hx of BPH and uses jardiance. UA positive for WBC and presented w sepsis.  - c/w CTX 1 g daily Patient felt weak and steadied himself to the ground. Likely weak in setting of sepsis and dehydration (see below). SIRS+ w urinary complaints and positive UA, high urine spec grav and dry on exam. Patient did not hit head, CT showed no bleed or acute infarction, no focal deficit. No metabolic derangement to explain weakness or fall. No medication to explain weakness. Improvement in weakness after fluid resuscitation and starting abx. Orthostats negative.   - continue to treat UTI per plan below  - PT recs home with home PT

## 2021-11-17 NOTE — PROGRESS NOTE ADULT - PROBLEM SELECTOR PLAN 1
Patient felt weak and steadied himself to the ground. Likely weak in setting of sepsis and dehydration (see below). SIRS+ w urinary complaints and positive UA, high urine spec grav and dry on exam. Patient did not hit head, CT showed no bleed or acute infarction, no focal deficit. No metabolic derangement to explain weakness or fall. No medication to explain weakness. Improvement in weakness after fluid resuscitation and starting abx. Orthostats negative.   - continue to treat UTI per plan below  - PT recs home with home PT Pt has complaints of dysuria w hx of BPH and uses jardiance. UA positive for WBC and presented w sepsis. Urine culture positive for gram negative organisms. Pt denies any acute urinary symptoms which are unchanged from baseline.  -C/w ceftriaxone 1g q24   -f/u susceptibilities and change abx as appropriate

## 2021-11-17 NOTE — PROGRESS NOTE ADULT - PROBLEM SELECTOR PLAN 2
SIRS 2/4 hypothermic, leukocytosis w source UTI. No pulmonary complaints or other source of infxn. Pt fluid resuscitated and started on CTX. WBC downtrending, temp normal. Weakness resolved.  - c/w CTX 1g daily  - f/u blood cx  - f/u urine cx SIRS 2/4 hypothermic, leukocytosis w source UTI. No pulmonary complaints or other source of infections. Pt fluid resuscitated and started on CTX. WBC downtrending, temp normal. Weakness resolved. UCx showing gram positive organisms. Susceptibilities pending   - c/w CTX 1g daily  - f/u blood cx

## 2021-11-17 NOTE — PROGRESS NOTE ADULT - PROBLEM SELECTOR PLAN 4
Likely pre renal as patient rapidly improved w fluid resuscitation and pt dry on exam w high spec grav. Pt did have proteinuria, could be diabetic nephropathy, possibility for a nephrotic syndrome though pt not edematous w albuin in normal range.   - f/u Pr/Cr ratio  - treat sepsis as per plan above Likely pre renal as patient rapidly improved w fluid resuscitation and pt dry on exam w high spec grav. Pt did have proteinuria, could be diabetic nephropathy, possibility for a nephrotic syndrome though pt not edematous w albumin in normal range.   - AM Cr 1.61 -- continue to monitor/trend   - Pr/Cr ratio 2.2, not indicative of Nephrotic syndrome   - treat sepsis as per plan above  -avoid nephrotoxic medications  -renally dose medications

## 2021-11-17 NOTE — ADVANCED PRACTICE NURSE CONSULT - REASON FOR CONSULT
14;15 [facility] contacted by endocrine fellow  MYKE Patino MD re: Patient is a 89y old  Male who presents with a chief complaint of fall (17 Nov 2021 07:53)  Patient family here today at bedside, daughter and granddaughter.  Pts granddaughter Nemo informed [facility] that the patient had diabetes mellitus for some time and has been doing " some" SBGM.  Nemo contacted pt provider who indicated ( in writing) that pt is taking glipizide 2.5 mg ER  BID and Glipizide 5 mg ER BID.    A1c >20% now ( up from 7.4 % on 9/8/2021).  [facility] informed Dr. Sukumar PHAN of this report.    Dr. Patino indicated that pt is to be discharged on insulin regimen- basal/bolus as per endocrine NP Estela.

## 2021-11-17 NOTE — PROGRESS NOTE ADULT - PROBLEM SELECTOR PLAN 7
Losartan 25 and propranolol 20 at home  - hold as patient septic and normotensive Losartan 25 and propranolol 20 at home  - pt normotensive, continue to monitor   - hold as patient septic and normotensive

## 2021-11-17 NOTE — PROGRESS NOTE ADULT - PROBLEM SELECTOR PLAN 8
On finasteride at home  - c/w finasteride and add tamsulosin On finasteride at home  - c/w finasteride   - started on tamsulosin 0.4 mg qd PO

## 2021-11-17 NOTE — ADVANCED PRACTICE NURSE CONSULT - RECOMMEDATIONS
[facility] requested, of Dr. Davis that pt and family be allowed to receive another education session, tomorrow 11/18 on use of insulin pen injection device.    Dr. Davis agreed

## 2021-11-17 NOTE — ADVANCED PRACTICE NURSE CONSULT - ASSESSMENT
With regards to insulin regimen, CDCYASH expressed concern as pt resides with his elderly wife.    CDCES attempted to teach daughter and granddaughter insulin pen injection technique including preparation of device, hygiene, dosing, sharps disposal.  Both daughter and granddaughter demonstrated their understanding.  Pt, however, held device and then explained that there were too many steps and he wants someone else to do it.  Granddaughter eNmo and daughter stated that maybe now he should come and live with them.

## 2021-11-17 NOTE — PROGRESS NOTE ADULT - PROBLEM SELECTOR PLAN 3
Presented with A1c>20 and glucosuria. Additionally had ketonuria but no acidosis, likely 2/2 to dehydration and reduced PO intake. Has only had DM a few years but unlikely pancreatic mass as patient not jaunediced not exhibiting other signs of pancreatic mass. On metformin and jardiance at home. Suspect possible DM1  - start basal bolus regimen to continue at home  - 12U lantus and 8U premeal  - f/u DM1 ab labs  - endo consulted appreciate recs Presented with A1c>20 and glucosuria. Additionally had ketonuria but no acidosis, likely 2/2 to dehydration and reduced PO intake. Has only had DM a few years but unlikely pancreatic mass as patient not jaundiced not exhibiting other signs of pancreatic mass. On metformin and Jardiance at home. Suspect possible DM1  - 12U Lantus and 8U pre-meal per Endocrine recommendations   - Adjust insulin requirements per sliding scale requirements and Endocrine recommendations   - f/u DM1 ab labs -- still pending

## 2021-11-17 NOTE — PROGRESS NOTE ADULT - ASSESSMENT
89 y/o M w/ pmhx of HTN, DM, BPH presents with weakness and admitted for sepsis 2/2 UTI and found to have uncontrolled DM.
89 y/o M w/ pmhx of HTN, DM, BPH presents with weakness and admitted for sepsis 2/2 UTI and found to have uncontrolled DM w/ A1c >20. Endocrine consulted, patient started on subcutaneous insulin dosing.

## 2021-11-18 ENCOUNTER — TRANSCRIPTION ENCOUNTER (OUTPATIENT)
Age: 86
End: 2021-11-18

## 2021-11-18 VITALS
RESPIRATION RATE: 18 BRPM | HEART RATE: 73 BPM | TEMPERATURE: 98 F | SYSTOLIC BLOOD PRESSURE: 121 MMHG | OXYGEN SATURATION: 98 % | DIASTOLIC BLOOD PRESSURE: 73 MMHG

## 2021-11-18 LAB
A1C WITH ESTIMATED AVERAGE GLUCOSE RESULT: 12.9 % — HIGH (ref 4–5.6)
ALBUMIN SERPL ELPH-MCNC: 2.9 G/DL — LOW (ref 3.3–5)
ALP SERPL-CCNC: 57 U/L — SIGNIFICANT CHANGE UP (ref 40–120)
ALT FLD-CCNC: 9 U/L — LOW (ref 10–45)
ANION GAP SERPL CALC-SCNC: 14 MMOL/L — SIGNIFICANT CHANGE UP (ref 5–17)
AST SERPL-CCNC: 19 U/L — SIGNIFICANT CHANGE UP (ref 10–40)
BILIRUB SERPL-MCNC: 0.3 MG/DL — SIGNIFICANT CHANGE UP (ref 0.2–1.2)
BUN SERPL-MCNC: 28 MG/DL — HIGH (ref 7–23)
CALCIUM SERPL-MCNC: 8.9 MG/DL — SIGNIFICANT CHANGE UP (ref 8.4–10.5)
CHLORIDE SERPL-SCNC: 105 MMOL/L — SIGNIFICANT CHANGE UP (ref 96–108)
CO2 SERPL-SCNC: 20 MMOL/L — LOW (ref 22–31)
CREAT SERPL-MCNC: 1.25 MG/DL — SIGNIFICANT CHANGE UP (ref 0.5–1.3)
ESTIMATED AVERAGE GLUCOSE: 324 MG/DL — HIGH (ref 68–114)
GLUCOSE BLDC GLUCOMTR-MCNC: 167 MG/DL — HIGH (ref 70–99)
GLUCOSE BLDC GLUCOMTR-MCNC: 170 MG/DL — HIGH (ref 70–99)
GLUCOSE BLDC GLUCOMTR-MCNC: 171 MG/DL — HIGH (ref 70–99)
GLUCOSE SERPL-MCNC: 164 MG/DL — HIGH (ref 70–99)
HCT VFR BLD CALC: 32.4 % — LOW (ref 39–50)
HGB BLD-MCNC: 10.3 G/DL — LOW (ref 13–17)
MAGNESIUM SERPL-MCNC: 2.1 MG/DL — SIGNIFICANT CHANGE UP (ref 1.6–2.6)
MCHC RBC-ENTMCNC: 30.1 PG — SIGNIFICANT CHANGE UP (ref 27–34)
MCHC RBC-ENTMCNC: 31.8 GM/DL — LOW (ref 32–36)
MCV RBC AUTO: 94.7 FL — SIGNIFICANT CHANGE UP (ref 80–100)
NRBC # BLD: 0 /100 WBCS — SIGNIFICANT CHANGE UP (ref 0–0)
PHOSPHATE SERPL-MCNC: 3.2 MG/DL — SIGNIFICANT CHANGE UP (ref 2.5–4.5)
PLATELET # BLD AUTO: 147 K/UL — LOW (ref 150–400)
POTASSIUM SERPL-MCNC: 3.8 MMOL/L — SIGNIFICANT CHANGE UP (ref 3.5–5.3)
POTASSIUM SERPL-SCNC: 3.8 MMOL/L — SIGNIFICANT CHANGE UP (ref 3.5–5.3)
PROT SERPL-MCNC: 7.2 G/DL — SIGNIFICANT CHANGE UP (ref 6–8.3)
RBC # BLD: 3.42 M/UL — LOW (ref 4.2–5.8)
RBC # FLD: 13.4 % — SIGNIFICANT CHANGE UP (ref 10.3–14.5)
SODIUM SERPL-SCNC: 139 MMOL/L — SIGNIFICANT CHANGE UP (ref 135–145)
WBC # BLD: 6.62 K/UL — SIGNIFICANT CHANGE UP (ref 3.8–10.5)
WBC # FLD AUTO: 6.62 K/UL — SIGNIFICANT CHANGE UP (ref 3.8–10.5)

## 2021-11-18 PROCEDURE — 99232 SBSQ HOSP IP/OBS MODERATE 35: CPT | Mod: GC

## 2021-11-18 PROCEDURE — 99239 HOSP IP/OBS DSCHRG MGMT >30: CPT

## 2021-11-18 RX ORDER — INSULIN GLARGINE 100 [IU]/ML
12 INJECTION, SOLUTION SUBCUTANEOUS AT BEDTIME
Refills: 0 | Status: DISCONTINUED | OUTPATIENT
Start: 2021-11-18 | End: 2021-11-18

## 2021-11-18 RX ORDER — CEFPODOXIME PROXETIL 100 MG
1 TABLET ORAL
Qty: 0 | Refills: 0 | DISCHARGE
Start: 2021-11-18

## 2021-11-18 RX ORDER — CEPHALEXIN 500 MG
250 CAPSULE ORAL EVERY 6 HOURS
Refills: 0 | Status: DISCONTINUED | OUTPATIENT
Start: 2021-11-18 | End: 2021-11-18

## 2021-11-18 RX ORDER — CEFPODOXIME PROXETIL 100 MG
100 TABLET ORAL EVERY 12 HOURS
Refills: 0 | Status: DISCONTINUED | OUTPATIENT
Start: 2021-11-18 | End: 2021-11-18

## 2021-11-18 RX ORDER — INSULIN GLARGINE 100 [IU]/ML
12 INJECTION, SOLUTION SUBCUTANEOUS
Qty: 5 | Refills: 0
Start: 2021-11-18 | End: 2021-12-17

## 2021-11-18 RX ORDER — ATORVASTATIN CALCIUM 80 MG/1
1 TABLET, FILM COATED ORAL
Qty: 0 | Refills: 0 | DISCHARGE
Start: 2021-11-18

## 2021-11-18 RX ORDER — INSULIN GLARGINE 100 [IU]/ML
12 INJECTION, SOLUTION SUBCUTANEOUS
Qty: 0 | Refills: 0 | DISCHARGE
Start: 2021-11-18

## 2021-11-18 RX ORDER — TAMSULOSIN HYDROCHLORIDE 0.4 MG/1
1 CAPSULE ORAL
Qty: 0 | Refills: 0 | DISCHARGE
Start: 2021-11-18

## 2021-11-18 RX ORDER — INSULIN LISPRO 100/ML
12 VIAL (ML) SUBCUTANEOUS
Qty: 0 | Refills: 0 | DISCHARGE
Start: 2021-11-18

## 2021-11-18 RX ORDER — INSULIN LISPRO 100/ML
12 VIAL (ML) SUBCUTANEOUS
Qty: 5 | Refills: 0
Start: 2021-11-18 | End: 2021-12-17

## 2021-11-18 RX ORDER — INSULIN LISPRO 100/ML
12 VIAL (ML) SUBCUTANEOUS
Qty: 1080 | Refills: 0
Start: 2021-11-18 | End: 2021-12-17

## 2021-11-18 RX ORDER — FINASTERIDE 5 MG/1
1 TABLET, FILM COATED ORAL
Qty: 0 | Refills: 0 | DISCHARGE
Start: 2021-11-18

## 2021-11-18 RX ORDER — CEFPODOXIME PROXETIL 100 MG
1 TABLET ORAL
Qty: 6 | Refills: 0
Start: 2021-11-18

## 2021-11-18 RX ADMIN — POLYETHYLENE GLYCOL 3350 17 GRAM(S): 17 POWDER, FOR SOLUTION ORAL at 11:48

## 2021-11-18 RX ADMIN — Medication 12 UNIT(S): at 09:13

## 2021-11-18 RX ADMIN — Medication 2: at 09:14

## 2021-11-18 RX ADMIN — FINASTERIDE 5 MILLIGRAM(S): 5 TABLET, FILM COATED ORAL at 11:48

## 2021-11-18 RX ADMIN — Medication 12 UNIT(S): at 12:49

## 2021-11-18 RX ADMIN — Medication 2: at 12:49

## 2021-11-18 NOTE — DIETITIAN INITIAL EVALUATION ADULT. - OTHER INFO
Pt is a 89 y/o Cantonese-speaking M w/ PMHx of HTN, DM, BPH presenting after fall 11/16. Pt admitted for sepsis 2/2 to possible UTI, YOVANI and PT eval. Pending medical clearance for d/c.     On assessment, pt was sitting upright in his bed with PCA by his side, currently on a Consistent Carbohydrate/Renal diet.  used to communicate with pt,  name & ID: Herson [id number]. Pt reported a good appetite, eating about 50-75% of his meals, which was confirmed by PCA. According to PCA, a lot of the foods the pt wants to order he can't have given renal and Consistent Carbohydrate diet. Pt agreeable to Nepro TIDEVI, communicated these recs with the team. No n/v/d/c reported at this time. Last +BM 11/15. Skin: 19, no pressure injuries, no edema. Pt denies pain at this time. PTA, pt says he was eating well, mostly rice, pork, fish, sweets, fruit juice. He denies any severe wt changes, but notes that in his older age he has lost weight. No wt records in [facility]. Visually, pt appears to be about 5'6. Unable to get accurate weight. Estimated BMI: 21.8 kg/m^2. Provided pt with in-depth diabetes education through the use of the . Pt seemed receptive and understanding.  Please see nutrition recommendations below. Pt is a 89 y/o Cantonese-speaking M w/ PMHx of HTN, DM, BPH presenting after fall 11/16. Pt admitted for sepsis 2/2 to possible UTI, YOVANI and PT eval. Pending medical clearance for d/c.     On assessment, pt was sitting upright in his bed with PCA by his side, currently on a Consistent Carbohydrate/Renal diet.  used to communicate with pt,  name & ID: Herson [id number]. Pt reported a good appetite, eating about 50-75% of his meals, which was confirmed by PCA. According to PCA, a lot of the foods the pt wants to order he can't have given renal and Consistent Carbohydrate diet. Pt agreeable to Nepro TIDEVI, communicated these recs with the team. No n/v/d/c reported at this time. Last +BM 11/15. Skin: 19, no pressure injuries, no edema. Pt denies pain at this time. PTA, pt says he was eating well, mostly rice, pork, fish, sweets, fruit juice. He denies any severe wt changes, but notes that in his older age he has lost weight. No wt records in [facility]. Visually, pt appears to be about 66 inches. Unable to get accurate height. Estimated BMI: 21.8 kg/m^2. Provided pt with in-depth diabetes education through the use of the . Pt seemed receptive and understanding.  Please see nutrition recommendations below. Pt is a 89 y/o Cantonese-speaking M w/ PMHx of HTN, DM, BPH presenting after fall 11/16. Pt admitted for sepsis 2/2 to possible UTI, YOVANI and PT eval. Pending medical clearance for d/c.     On assessment, pt was sitting upright in his bed with PCA by his side, currently on a Consistent Carbohydrate/Renal diet.  used to communicate with pt,  name & ID: Herson [id number]. Pt reported a good appetite, eating about 50-75% of his meals, which was confirmed by PCA. According to PCA, a lot of the foods the pt wants to order he can't have given renal and Consistent Carbohydrate diet. Pt agreeable to Nepro once/day, communicated these recs with the team. No n/v/d/c reported at this time. Last +BM 11/15. Skin: 19, no pressure injuries, no edema. Pt denies pain at this time. PTA, pt says he was eating well, mostly rice, pork, fish, sweets, fruit juice. He denies any severe wt changes, but notes that in his older age he has lost weight gradually. No wt records in [facility]. Visually, pt appears to be about 66 inches. Estimated BMI: 21.8 kg/m^2. Provided pt with in-depth diabetes education through the use of the . Pt seemed receptive and understanding.  Please see nutrition recommendations below.

## 2021-11-18 NOTE — PROGRESS NOTE ADULT - ATTENDING COMMENTS
Pt seen on rounds this afternoon.  Diabetes Educator also worked with the pt with the help of his family.  Questions remain as to whether he will be willing to take insulin and also whether he can manage it.  The A1c level of 7.8% in September obviously raises the additional question as to the reason for his marked deterioration.  The usual explanations would be a marked lapse in diet (which does not seem to have occurred), an acute illness, new medications (i.e. steroids, etc), transition to type I DM  or (remotely) a new problem such as pancreatic CA.  Glucoses today were improved, including values of 150 mg% overnight.  The spike to 338 after lunch is difficult to explain, though there is a question as to his actual fingerstick before the meal.  Will stay with 12 units Lantus, increase the premeal lispro to 12 units
Pt seen on rounds this afternoon.  Met with family prior to discharge.    Glucoses have been stable and in a safe and satisfactory range since last night--127/167/171  Teaching has been completed.  Diet discussed with the family, emphasizing that he needs to eat starch with every meal--his usual portion of rice at the two main meals, bread (or a bun) at breakfast.  He will be supervised partially by his wife, partially by a home health aide (who will remind him re: injections)

## 2021-11-18 NOTE — DISCHARGE NOTE NURSING/CASE MANAGEMENT/SOCIAL WORK - PATIENT PORTAL LINK FT
You can access the FollowMyHealth Patient Portal offered by [facility] by registering at the following website: [email or web address]. By joining [facility]’s FollowMyHealth portal, you will also be able to view your health information using other applications (apps) compatible with our system.

## 2021-11-18 NOTE — DIETITIAN INITIAL EVALUATION ADULT. - ADD RECOMMEND
1. Continue with Consistent Carbohydrate/Renal diet. Monitor %PO intake, Monitor diet tolerance. 2. Monitor lytes, replete prn. 3. RD to remain available prn.

## 2021-11-18 NOTE — DIETITIAN INITIAL EVALUATION ADULT. - PROBLEM SELECTOR PLAN 4
Hb 11.8, unknown baseline.  - pt reports 1-2 year hx of black stools, unclear as pt is poor historian   - continue to monitor Hb and stools  - start ppi  - check fobt

## 2021-11-18 NOTE — DIETITIAN INITIAL EVALUATION ADULT. - PERTINENT LABORATORY DATA
A1C: >20  Hemoglobin: 10.3 (L)   Hematocrit: 32.4 (L)  POCT: 171 (H)   BUN: 28 (H)   Glucose: 164 (H)   ALT: 9 (L)   eGFR: 50 (L)  CRP: 2.2 (H) A1C: 12.9  Hemoglobin: 10.3 (L)   Hematocrit: 32.4 (L)  POCT: 171 (H)   BUN: 28 (H)   Glucose: 164 (H)   ALT: 9 (L)   eGFR: 50 (L)  CRP: 2.2 (H)

## 2021-11-18 NOTE — DISCHARGE NOTE NURSING/CASE MANAGEMENT/SOCIAL WORK - NSDCFUADDAPPT_GEN_ALL_CORE_FT
Please bring your Insurance card, Photo ID, and Discharge paperwork to the following appointment:    (1) Please follow up with your Urology Provider, Dr. David Guillermo at [address] [place] [address], [place] [address] on 12/02/2021 at 1:00pm.    Appointment was scheduled by Ms. CELINA Franco, Referral Coordinator.

## 2021-11-18 NOTE — PROGRESS NOTE ADULT - SUBJECTIVE AND OBJECTIVE BOX
INTERVAL HPI/OVERNIGHT EVENTS:    Patient is a 89y old  Male who presents with a chief complaint of fall (18 Nov 2021 13:44)      Pt reports the following symptoms:    CONSTITUTIONAL:  Negative fever or chills, feels well, good appetite  EYES:  Negative  blurry vision or double vision  CARDIOVASCULAR:  Negative for chest pain or palpitations  RESPIRATORY:  Negative for cough, wheezing, or SOB   GASTROINTESTINAL:  Negative for nausea, vomiting, diarrhea, constipation, or abdominal pain  GENITOURINARY:  Negative frequency, urgency or dysuria  NEUROLOGIC:  No headache, confusion, dizziness, lightheadedness    MEDICATIONS  (STANDING):  atorvastatin 40 milliGRAM(s) Oral at bedtime  cefpodoxime 100 milliGRAM(s) Oral every 12 hours  dextrose 40% Gel 15 Gram(s) Oral once  dextrose 5%. 1000 milliLiter(s) (50 mL/Hr) IV Continuous <Continuous>  dextrose 50% Injectable 25 Gram(s) IV Push once  enoxaparin Injectable 40 milliGRAM(s) SubCutaneous every 24 hours  finasteride 5 milliGRAM(s) Oral daily  glucagon  Injectable 1 milliGRAM(s) IntraMuscular once  influenza  Vaccine (HIGH DOSE) 0.7 milliLiter(s) IntraMuscular once  insulin glargine Injectable (LANTUS) 12 Unit(s) SubCutaneous at bedtime  insulin lispro (ADMELOG) corrective regimen sliding scale   SubCutaneous Before meals and at bedtime  insulin lispro Injectable (ADMELOG) 12 Unit(s) SubCutaneous three times a day before meals  polyethylene glycol 3350 17 Gram(s) Oral daily  senna 2 Tablet(s) Oral at bedtime  tamsulosin 0.4 milliGRAM(s) Oral at bedtime    MEDICATIONS  (PRN):  acetaminophen     Tablet .. 650 milliGRAM(s) Oral every 6 hours PRN Temp greater or equal to 38C (100.4F), Mild Pain (1 - 3)      PHYSICAL EXAM  Vital Signs Last 24 Hrs  T(C): 36.8 (18 Nov 2021 09:02), Max: 36.9 (18 Nov 2021 05:22)  T(F): 98.3 (18 Nov 2021 09:02), Max: 98.5 (18 Nov 2021 05:22)  HR: 74 (18 Nov 2021 09:02) (69 - 83)  BP: 122/78 (18 Nov 2021 09:02) (122/78 - 133/71)  BP(mean): --  RR: 18 (18 Nov 2021 09:02) (18 - 19)  SpO2: 98% (18 Nov 2021 09:02) (97% - 99%)    Constitutional: wn/wd in NAD.   HEENT: NCAT, MMM, OP clear, EOMI, , no proptosis or lid retraction  Neck: no thyromegaly or palpable thyroid nodules   Respiratory: lungs CTAB.  Cardiovascular: regular rhythm, normal S1 and S2, no audible murmurs, no peripheral edema  GI: soft, NT/ND, no masses/HSM appreciated.  Neurology: no tremors, DTR 2+  Skin: no visible rashes/lesions  Psychiatric: AAO x 3, normal affect/mood.    LABS:                        10.3   6.62  )-----------( 147      ( 18 Nov 2021 08:18 )             32.4     11-18    139  |  105  |  28<H>  ----------------------------<  164<H>  3.8   |  20<L>  |  1.25    Ca    8.9      18 Nov 2021 08:18  Phos  3.2     11-18  Mg     2.1     11-18    TPro  7.2  /  Alb  2.9<L>  /  TBili  0.3  /  DBili  x   /  AST  19  /  ALT  9<L>  /  AlkPhos  57  11-18        Thyroid Stimulating Hormone, Serum: 0.842 uIU/mL (11-16 @ 06:14)      HbA1C:         Insulin Sliding Scale requirements X 24 Hours:      RADIOLOGY & ADDITIONAL TESTS:      A/P:89 y/o M w/ pmhx of HTN, DM, BPH presenting after fall today. Pt admitted for sepsis 2/2 to possible UTI and PT eval.  A1C: >20%  Wt: 61.2kg  Cr: 1.27  GFR: 49  Home regimen: Per surescripts : Jardiance 10mg, Glipizide 5mg ER, Metformin 500mg BID  1.  DM type 2- uncontrolled , questionable type 1 late onset diabetes  c-peptide level 1.1  f/u TEDDY antibodies, and islet cell antibodies  last A1C outpatient in Sept 2021 reported to be 7.8%  Repeat A1C 11/18: 12.9%  May need to assess for additional causes of rapid increases in A1C and loss of insulin reserves   Please continue lantus    units at night.    Please continue lispro     units before each meal.    Please continue lispro moderate dose sliding scale four times daily with meals and at bedtime    Pt's fingerstick glucose goal is 100-180    Patient has had insulin and diabetes teaching, CDE following  Large concern for safety with insulin use- family is willing to help     Will continue to monitor       Pt can follow up at discharge with [facility] by calling  to make an appointment.   Will discuss case with Dr. Earl  and update primary team     INTERVAL HPI/OVERNIGHT EVENTS:    Patient is a 89y old  Male who presents with a chief complaint of fall (18 Nov 2021 13:44)      Pt reports the following symptoms:    CONSTITUTIONAL:  Negative fever or chills, feels well, good appetite  EYES:  Negative  blurry vision or double vision  CARDIOVASCULAR:  Negative for chest pain or palpitations  RESPIRATORY:  Negative for cough, wheezing, or SOB   GASTROINTESTINAL:  Negative for nausea, vomiting, diarrhea, constipation, or abdominal pain  GENITOURINARY:  Negative frequency, urgency or dysuria  NEUROLOGIC:  No headache, confusion, dizziness, lightheadedness    MEDICATIONS  (STANDING):  atorvastatin 40 milliGRAM(s) Oral at bedtime  cefpodoxime 100 milliGRAM(s) Oral every 12 hours  dextrose 40% Gel 15 Gram(s) Oral once  dextrose 5%. 1000 milliLiter(s) (50 mL/Hr) IV Continuous <Continuous>  dextrose 50% Injectable 25 Gram(s) IV Push once  enoxaparin Injectable 40 milliGRAM(s) SubCutaneous every 24 hours  finasteride 5 milliGRAM(s) Oral daily  glucagon  Injectable 1 milliGRAM(s) IntraMuscular once  influenza  Vaccine (HIGH DOSE) 0.7 milliLiter(s) IntraMuscular once  insulin glargine Injectable (LANTUS) 12 Unit(s) SubCutaneous at bedtime  insulin lispro (ADMELOG) corrective regimen sliding scale   SubCutaneous Before meals and at bedtime  insulin lispro Injectable (ADMELOG) 12 Unit(s) SubCutaneous three times a day before meals  polyethylene glycol 3350 17 Gram(s) Oral daily  senna 2 Tablet(s) Oral at bedtime  tamsulosin 0.4 milliGRAM(s) Oral at bedtime    MEDICATIONS  (PRN):  acetaminophen     Tablet .. 650 milliGRAM(s) Oral every 6 hours PRN Temp greater or equal to 38C (100.4F), Mild Pain (1 - 3)      PHYSICAL EXAM  Vital Signs Last 24 Hrs  T(C): 36.8 (18 Nov 2021 09:02), Max: 36.9 (18 Nov 2021 05:22)  T(F): 98.3 (18 Nov 2021 09:02), Max: 98.5 (18 Nov 2021 05:22)  HR: 74 (18 Nov 2021 09:02) (69 - 83)  BP: 122/78 (18 Nov 2021 09:02) (122/78 - 133/71)  BP(mean): --  RR: 18 (18 Nov 2021 09:02) (18 - 19)  SpO2: 98% (18 Nov 2021 09:02) (97% - 99%)    Constitutional: wn/wd in NAD.   HEENT: NCAT, MMM, OP clear, EOMI, , no proptosis or lid retraction  Neck: no thyromegaly or palpable thyroid nodules   Respiratory: lungs CTAB.  Cardiovascular: regular rhythm, normal S1 and S2, no audible murmurs, no peripheral edema  GI: soft, NT/ND, no masses/HSM appreciated.  Neurology: no tremors, DTR 2+  Skin: no visible rashes/lesions  Psychiatric: AAO x 3, normal affect/mood.    LABS:                        10.3   6.62  )-----------( 147      ( 18 Nov 2021 08:18 )             32.4     11-18    139  |  105  |  28<H>  ----------------------------<  164<H>  3.8   |  20<L>  |  1.25    Ca    8.9      18 Nov 2021 08:18  Phos  3.2     11-18  Mg     2.1     11-18    TPro  7.2  /  Alb  2.9<L>  /  TBili  0.3  /  DBili  x   /  AST  19  /  ALT  9<L>  /  AlkPhos  57  11-18        Thyroid Stimulating Hormone, Serum: 0.842 uIU/mL (11-16 @ 06:14)      HbA1C:         Insulin Sliding Scale requirements X 24 Hours:      RADIOLOGY & ADDITIONAL TESTS:      A/P:89 y/o M w/ pmhx of HTN, DM, BPH presenting after fall today. Pt admitted for sepsis 2/2 to possible UTI and PT eval.  A1C: >20%  Wt: 61.2kg  Cr: 1.27  GFR: 49  Home regimen: Per surescripts : Jardiance 10mg, Glipizide 5mg ER, Metformin 500mg BID  1.  DM type 2- uncontrolled , questionable type 1 late onset diabetes  c-peptide level 1.1  f/u TEDDY antibodies, and islet cell antibodies  last A1C outpatient in Sept 2021 reported to be 7.8%  Repeat A1C 11/18: 12.9%  May need to assess for additional causes of rapid increases in A1C and loss of insulin reserves   Please continue lantus 12   units at night.    Please continue lispro  12   units before each meal.    Please continue lispro moderate dose sliding scale four times daily with meals and at bedtime    Pt's fingerstick glucose goal is 100-180    Patient has had insulin and diabetes teaching, CDCE following  Large concern for safety with insulin use- family is willing to help     Will continue to monitor       Pt can follow up at discharge with [facility] by calling  to make an appointment.   Will discuss case with Dr. Earl  and update primary team

## 2021-11-18 NOTE — DIETITIAN INITIAL EVALUATION ADULT. - OTHER CALCULATIONS
%IBW=95%; ABW used to calculate estimated needs due to patient being between 80 and 120% of estimated needs. Adjusted for advanced age and clinical judgment. %IBW=95%; ABW used to calculate estimated needs due to patient being between 80 and 120% of IBW. Adjusted for advanced age, sepsis, and clinical judgment.

## 2021-11-18 NOTE — DIETITIAN INITIAL EVALUATION ADULT. - PROBLEM SELECTOR PLAN 1
Pt presenting after fall today while out shopping, legs became weak and he fell on his buttocks. No prodromal syndromes. Denies any pain. CTH w/ chronic subdural collections/hygromas, no fx or bleed. Pt reports 2-3 years of leg pain and numbness likely 2/2 to possible peripheral neuropathy from DM. Fall likely in setting of UTI. Low suspicion for neurogenic or cardiac cause.  - check a1c, b12, folate,  - PT evaluation

## 2021-11-19 PROBLEM — E11.9 TYPE 2 DIABETES MELLITUS WITHOUT COMPLICATIONS: Chronic | Status: ACTIVE | Noted: 2021-11-15

## 2021-11-19 PROBLEM — I10 ESSENTIAL (PRIMARY) HYPERTENSION: Chronic | Status: ACTIVE | Noted: 2021-11-15

## 2021-11-19 PROBLEM — Z00.00 ENCOUNTER FOR PREVENTIVE HEALTH EXAMINATION: Status: ACTIVE | Noted: 2021-11-19

## 2021-11-20 LAB — ISLET CELL512 AB SER-ACNC: SIGNIFICANT CHANGE UP

## 2021-11-21 LAB
CULTURE RESULTS: SIGNIFICANT CHANGE UP
CULTURE RESULTS: SIGNIFICANT CHANGE UP
GAD65 AB SER-MCNC: 0 NMOL/L — SIGNIFICANT CHANGE UP
SPECIMEN SOURCE: SIGNIFICANT CHANGE UP
SPECIMEN SOURCE: SIGNIFICANT CHANGE UP

## 2021-11-24 DIAGNOSIS — I10 ESSENTIAL (PRIMARY) HYPERTENSION: ICD-10-CM

## 2021-11-24 DIAGNOSIS — E11.65 TYPE 2 DIABETES MELLITUS WITH HYPERGLYCEMIA: ICD-10-CM

## 2021-11-24 DIAGNOSIS — A41.9 SEPSIS, UNSPECIFIED ORGANISM: ICD-10-CM

## 2021-11-24 DIAGNOSIS — N40.0 BENIGN PROSTATIC HYPERPLASIA WITHOUT LOWER URINARY TRACT SYMPTOMS: ICD-10-CM

## 2021-11-24 DIAGNOSIS — G96.01 CRANIAL CEREBROSPINAL FLUID LEAK, SPONTANEOUS: ICD-10-CM

## 2021-11-24 DIAGNOSIS — N39.0 URINARY TRACT INFECTION, SITE NOT SPECIFIED: ICD-10-CM

## 2021-11-24 DIAGNOSIS — E86.0 DEHYDRATION: ICD-10-CM

## 2021-11-24 DIAGNOSIS — N17.9 ACUTE KIDNEY FAILURE, UNSPECIFIED: ICD-10-CM

## 2021-11-24 DIAGNOSIS — E11.42 TYPE 2 DIABETES MELLITUS WITH DIABETIC POLYNEUROPATHY: ICD-10-CM

## 2021-11-24 DIAGNOSIS — D63.8 ANEMIA IN OTHER CHRONIC DISEASES CLASSIFIED ELSEWHERE: ICD-10-CM

## 2021-12-15 ENCOUNTER — APPOINTMENT (OUTPATIENT)
Dept: ENDOCRINOLOGY | Facility: CLINIC | Age: 86
End: 2021-12-15

## 2021-12-22 PROCEDURE — U0003: CPT

## 2021-12-22 PROCEDURE — 86341 ISLET CELL ANTIBODY: CPT

## 2021-12-22 PROCEDURE — 36415 COLL VENOUS BLD VENIPUNCTURE: CPT

## 2021-12-22 PROCEDURE — 83735 ASSAY OF MAGNESIUM: CPT

## 2021-12-22 PROCEDURE — 82550 ASSAY OF CK (CPK): CPT

## 2021-12-22 PROCEDURE — 93005 ELECTROCARDIOGRAM TRACING: CPT

## 2021-12-22 PROCEDURE — 82607 VITAMIN B-12: CPT

## 2021-12-22 PROCEDURE — 83540 ASSAY OF IRON: CPT

## 2021-12-22 PROCEDURE — 87635 SARS-COV-2 COVID-19 AMP PRB: CPT

## 2021-12-22 PROCEDURE — 80048 BASIC METABOLIC PNL TOTAL CA: CPT

## 2021-12-22 PROCEDURE — 83550 IRON BINDING TEST: CPT

## 2021-12-22 PROCEDURE — 87086 URINE CULTURE/COLONY COUNT: CPT

## 2021-12-22 PROCEDURE — 82962 GLUCOSE BLOOD TEST: CPT

## 2021-12-22 PROCEDURE — 85025 COMPLETE CBC W/AUTO DIFF WBC: CPT

## 2021-12-22 PROCEDURE — 81001 URINALYSIS AUTO W/SCOPE: CPT

## 2021-12-22 PROCEDURE — 97161 PT EVAL LOW COMPLEX 20 MIN: CPT

## 2021-12-22 PROCEDURE — 84484 ASSAY OF TROPONIN QUANT: CPT

## 2021-12-22 PROCEDURE — 84443 ASSAY THYROID STIM HORMONE: CPT

## 2021-12-22 PROCEDURE — 96374 THER/PROPH/DIAG INJ IV PUSH: CPT

## 2021-12-22 PROCEDURE — 99285 EMERGENCY DEPT VISIT HI MDM: CPT

## 2021-12-22 PROCEDURE — 84100 ASSAY OF PHOSPHORUS: CPT

## 2021-12-22 PROCEDURE — 84156 ASSAY OF PROTEIN URINE: CPT

## 2021-12-22 PROCEDURE — 82570 ASSAY OF URINE CREATININE: CPT

## 2021-12-22 PROCEDURE — 80053 COMPREHEN METABOLIC PANEL: CPT

## 2021-12-22 PROCEDURE — 70450 CT HEAD/BRAIN W/O DYE: CPT

## 2021-12-22 PROCEDURE — 84681 ASSAY OF C-PEPTIDE: CPT

## 2021-12-22 PROCEDURE — 82728 ASSAY OF FERRITIN: CPT

## 2021-12-22 PROCEDURE — 86900 BLOOD TYPING SEROLOGIC ABO: CPT

## 2021-12-22 PROCEDURE — 82746 ASSAY OF FOLIC ACID SERUM: CPT

## 2021-12-22 PROCEDURE — 86901 BLOOD TYPING SEROLOGIC RH(D): CPT

## 2021-12-22 PROCEDURE — 87040 BLOOD CULTURE FOR BACTERIA: CPT

## 2021-12-22 PROCEDURE — 71045 X-RAY EXAM CHEST 1 VIEW: CPT

## 2021-12-22 PROCEDURE — 84300 ASSAY OF URINE SODIUM: CPT

## 2021-12-22 PROCEDURE — 86850 RBC ANTIBODY SCREEN: CPT

## 2021-12-22 PROCEDURE — 85027 COMPLETE CBC AUTOMATED: CPT

## 2021-12-22 PROCEDURE — 83036 HEMOGLOBIN GLYCOSYLATED A1C: CPT
